# Patient Record
Sex: FEMALE | Race: WHITE | NOT HISPANIC OR LATINO | Employment: OTHER | URBAN - METROPOLITAN AREA
[De-identification: names, ages, dates, MRNs, and addresses within clinical notes are randomized per-mention and may not be internally consistent; named-entity substitution may affect disease eponyms.]

---

## 2019-03-19 ENCOUNTER — OUTSIDE FACILITY SERVICE (OUTPATIENT)
Dept: CARDIOLOGY | Facility: CLINIC | Age: 84
End: 2019-03-19

## 2019-03-19 PROCEDURE — 99223 1ST HOSP IP/OBS HIGH 75: CPT | Performed by: INTERNAL MEDICINE

## 2019-03-20 ENCOUNTER — HOSPITAL ENCOUNTER (INPATIENT)
Facility: HOSPITAL | Age: 84
LOS: 4 days | Discharge: HOME HEALTH | End: 2019-03-25
Attending: INTERNAL MEDICINE | Admitting: INTERNAL MEDICINE

## 2019-03-20 DIAGNOSIS — Z74.09 IMPAIRED FUNCTIONAL MOBILITY, BALANCE, GAIT, AND ENDURANCE: ICD-10-CM

## 2019-03-20 DIAGNOSIS — I48.21 PERMANENT ATRIAL FIBRILLATION (HCC): Primary | ICD-10-CM

## 2019-03-20 DIAGNOSIS — R00.1 BRADYCARDIA: ICD-10-CM

## 2019-03-20 DIAGNOSIS — Z86.73 HISTORY OF CVA IN ADULTHOOD: ICD-10-CM

## 2019-03-20 PROBLEM — Z78.9 POOR HISTORIAN: Status: ACTIVE | Noted: 2019-03-20

## 2019-03-20 PROBLEM — I10 ESSENTIAL HYPERTENSION: Status: ACTIVE | Noted: 2019-03-20

## 2019-03-20 PROBLEM — I42.9 CARDIOMYOPATHY (HCC): Status: ACTIVE | Noted: 2019-03-20

## 2019-03-20 PROBLEM — E78.5 HYPERLIPIDEMIA LDL GOAL <100: Status: ACTIVE | Noted: 2019-03-20

## 2019-03-20 PROBLEM — N17.9 AKI (ACUTE KIDNEY INJURY) (HCC): Status: ACTIVE | Noted: 2019-03-20

## 2019-03-20 PROBLEM — E11.9 DM (DIABETES MELLITUS), TYPE 2 (HCC): Status: ACTIVE | Noted: 2019-03-20

## 2019-03-20 PROBLEM — K21.9 GERD (GASTROESOPHAGEAL REFLUX DISEASE): Status: ACTIVE | Noted: 2019-03-20

## 2019-03-20 LAB
ALBUMIN SERPL-MCNC: 4.25 G/DL (ref 3.2–4.8)
ALBUMIN/GLOB SERPL: 1.7 G/DL (ref 1.5–2.5)
ALP SERPL-CCNC: 77 U/L (ref 25–100)
ALT SERPL W P-5'-P-CCNC: 428 U/L (ref 7–40)
ANION GAP SERPL CALCULATED.3IONS-SCNC: 8 MMOL/L (ref 3–11)
AST SERPL-CCNC: 336 U/L (ref 0–33)
BILIRUB SERPL-MCNC: 0.9 MG/DL (ref 0.3–1.2)
BUN BLD-MCNC: 20 MG/DL (ref 9–23)
BUN/CREAT SERPL: 25.3 (ref 7–25)
CALCIUM SPEC-SCNC: 9 MG/DL (ref 8.7–10.4)
CHLORIDE SERPL-SCNC: 105 MMOL/L (ref 99–109)
CO2 SERPL-SCNC: 22 MMOL/L (ref 20–31)
CREAT BLD-MCNC: 0.79 MG/DL (ref 0.6–1.3)
DEPRECATED RDW RBC AUTO: 56 FL (ref 37–54)
ERYTHROCYTE [DISTWIDTH] IN BLOOD BY AUTOMATED COUNT: 17 % (ref 11.3–14.5)
GFR SERPL CREATININE-BSD FRML MDRD: 69 ML/MIN/1.73
GLOBULIN UR ELPH-MCNC: 2.5 GM/DL
GLUCOSE BLD-MCNC: 101 MG/DL (ref 70–100)
GLUCOSE BLDC GLUCOMTR-MCNC: 114 MG/DL (ref 70–130)
GLUCOSE BLDC GLUCOMTR-MCNC: 191 MG/DL (ref 70–130)
HCT VFR BLD AUTO: 38 % (ref 34.5–44)
HGB BLD-MCNC: 11.8 G/DL (ref 11.5–15.5)
MAGNESIUM SERPL-MCNC: 1.5 MG/DL (ref 1.3–2.7)
MCH RBC QN AUTO: 29.1 PG (ref 27–31)
MCHC RBC AUTO-ENTMCNC: 31.1 G/DL (ref 32–36)
MCV RBC AUTO: 93.6 FL (ref 80–99)
PLATELET # BLD AUTO: 224 10*3/MM3 (ref 150–450)
PMV BLD AUTO: 11 FL (ref 6–12)
POTASSIUM BLD-SCNC: 4.3 MMOL/L (ref 3.5–5.5)
PROT SERPL-MCNC: 6.7 G/DL (ref 5.7–8.2)
RBC # BLD AUTO: 4.06 10*6/MM3 (ref 3.89–5.14)
SODIUM BLD-SCNC: 135 MMOL/L (ref 132–146)
WBC NRBC COR # BLD: 9.53 10*3/MM3 (ref 3.5–10.8)

## 2019-03-20 PROCEDURE — A9270 NON-COVERED ITEM OR SERVICE: HCPCS | Performed by: INTERNAL MEDICINE

## 2019-03-20 PROCEDURE — 02HK3JZ INSERTION OF PACEMAKER LEAD INTO RIGHT VENTRICLE, PERCUTANEOUS APPROACH: ICD-10-PCS | Performed by: INTERNAL MEDICINE

## 2019-03-20 PROCEDURE — 36415 COLL VENOUS BLD VENIPUNCTURE: CPT

## 2019-03-20 PROCEDURE — 63710000001 METOPROLOL SUCCINATE XL 25 MG TABLET SUSTAINED-RELEASE 24 HOUR: Performed by: INTERNAL MEDICINE

## 2019-03-20 PROCEDURE — 83735 ASSAY OF MAGNESIUM: CPT | Performed by: PHYSICIAN ASSISTANT

## 2019-03-20 PROCEDURE — 63710000001 METFORMIN 1000 MG TABLET: Performed by: INTERNAL MEDICINE

## 2019-03-20 PROCEDURE — 80053 COMPREHEN METABOLIC PANEL: CPT | Performed by: PHYSICIAN ASSISTANT

## 2019-03-20 PROCEDURE — 82962 GLUCOSE BLOOD TEST: CPT

## 2019-03-20 PROCEDURE — 0JH604Z INSERTION OF PACEMAKER, SINGLE CHAMBER INTO CHEST SUBCUTANEOUS TISSUE AND FASCIA, OPEN APPROACH: ICD-10-PCS | Performed by: INTERNAL MEDICINE

## 2019-03-20 PROCEDURE — 85027 COMPLETE CBC AUTOMATED: CPT | Performed by: PHYSICIAN ASSISTANT

## 2019-03-20 PROCEDURE — 63710000001 ACETAMINOPHEN 325 MG TABLET: Performed by: INTERNAL MEDICINE

## 2019-03-20 RX ORDER — LORATADINE 10 MG/1
10 TABLET ORAL DAILY
COMMUNITY

## 2019-03-20 RX ORDER — FUROSEMIDE 20 MG/1
10 TABLET ORAL DAILY
Status: DISCONTINUED | OUTPATIENT
Start: 2019-03-20 | End: 2019-03-22

## 2019-03-20 RX ORDER — DILTIAZEM HYDROCHLORIDE 360 MG/1
360 CAPSULE, EXTENDED RELEASE ORAL DAILY
COMMUNITY
End: 2019-03-25 | Stop reason: HOSPADM

## 2019-03-20 RX ORDER — ONDANSETRON 2 MG/ML
4 INJECTION INTRAMUSCULAR; INTRAVENOUS EVERY 6 HOURS PRN
Status: DISCONTINUED | OUTPATIENT
Start: 2019-03-20 | End: 2019-03-25 | Stop reason: HOSPADM

## 2019-03-20 RX ORDER — ACETAMINOPHEN 325 MG/1
650 TABLET ORAL EVERY 4 HOURS PRN
Status: DISCONTINUED | OUTPATIENT
Start: 2019-03-20 | End: 2019-03-20

## 2019-03-20 RX ORDER — SODIUM CHLORIDE 0.9 % (FLUSH) 0.9 %
3-10 SYRINGE (ML) INJECTION AS NEEDED
Status: DISCONTINUED | OUTPATIENT
Start: 2019-03-20 | End: 2019-03-25 | Stop reason: HOSPADM

## 2019-03-20 RX ORDER — DILTIAZEM HYDROCHLORIDE 180 MG/1
360 CAPSULE, COATED, EXTENDED RELEASE ORAL
Status: DISCONTINUED | OUTPATIENT
Start: 2019-03-21 | End: 2019-03-22

## 2019-03-20 RX ORDER — PANTOPRAZOLE SODIUM 40 MG/1
40 TABLET, DELAYED RELEASE ORAL EVERY MORNING
Status: DISCONTINUED | OUTPATIENT
Start: 2019-03-21 | End: 2019-03-25 | Stop reason: HOSPADM

## 2019-03-20 RX ORDER — PRAVASTATIN SODIUM 40 MG
40 TABLET ORAL DAILY
COMMUNITY

## 2019-03-20 RX ORDER — LISINOPRIL 5 MG/1
5 TABLET ORAL DAILY
COMMUNITY
End: 2019-03-25 | Stop reason: HOSPADM

## 2019-03-20 RX ORDER — LISINOPRIL 5 MG/1
5 TABLET ORAL DAILY
Status: DISCONTINUED | OUTPATIENT
Start: 2019-03-21 | End: 2019-03-23

## 2019-03-20 RX ORDER — CETIRIZINE HYDROCHLORIDE 10 MG/1
5 TABLET ORAL DAILY
Status: DISCONTINUED | OUTPATIENT
Start: 2019-03-21 | End: 2019-03-25 | Stop reason: HOSPADM

## 2019-03-20 RX ORDER — ATORVASTATIN CALCIUM 10 MG/1
10 TABLET, FILM COATED ORAL DAILY
Status: DISCONTINUED | OUTPATIENT
Start: 2019-03-20 | End: 2019-03-21

## 2019-03-20 RX ORDER — OMEPRAZOLE 20 MG/1
20 CAPSULE, DELAYED RELEASE ORAL 2 TIMES DAILY
COMMUNITY

## 2019-03-20 RX ORDER — METOPROLOL SUCCINATE 25 MG/1
25 TABLET, EXTENDED RELEASE ORAL 2 TIMES DAILY
COMMUNITY
End: 2019-03-25 | Stop reason: HOSPADM

## 2019-03-20 RX ORDER — SODIUM CHLORIDE 0.9 % (FLUSH) 0.9 %
3 SYRINGE (ML) INJECTION EVERY 12 HOURS SCHEDULED
Status: DISCONTINUED | OUTPATIENT
Start: 2019-03-20 | End: 2019-03-25 | Stop reason: HOSPADM

## 2019-03-20 RX ORDER — METOPROLOL SUCCINATE 25 MG/1
25 TABLET, EXTENDED RELEASE ORAL 2 TIMES DAILY
Status: DISCONTINUED | OUTPATIENT
Start: 2019-03-20 | End: 2019-03-24

## 2019-03-20 RX ORDER — PIOGLITAZONEHYDROCHLORIDE 30 MG/1
30 TABLET ORAL DAILY
COMMUNITY
End: 2019-06-10 | Stop reason: ALTCHOICE

## 2019-03-20 RX ORDER — NITROGLYCERIN 0.4 MG/1
0.4 TABLET SUBLINGUAL
Status: DISCONTINUED | OUTPATIENT
Start: 2019-03-20 | End: 2019-03-25 | Stop reason: HOSPADM

## 2019-03-20 RX ORDER — ACETAMINOPHEN 325 MG/1
325 TABLET ORAL EVERY 6 HOURS PRN
Status: DISCONTINUED | OUTPATIENT
Start: 2019-03-20 | End: 2019-03-25 | Stop reason: HOSPADM

## 2019-03-20 RX ORDER — PIOGLITAZONEHYDROCHLORIDE 15 MG/1
30 TABLET ORAL DAILY
Status: DISCONTINUED | OUTPATIENT
Start: 2019-03-21 | End: 2019-03-24

## 2019-03-20 RX ADMIN — METOPROLOL SUCCINATE 25 MG: 25 TABLET, EXTENDED RELEASE ORAL at 21:31

## 2019-03-20 RX ADMIN — METFORMIN HYDROCHLORIDE 1000 MG: 1000 TABLET, FILM COATED ORAL at 21:31

## 2019-03-20 RX ADMIN — SODIUM CHLORIDE, PRESERVATIVE FREE 3 ML: 5 INJECTION INTRAVENOUS at 21:32

## 2019-03-20 RX ADMIN — ACETAMINOPHEN 325 MG: 325 TABLET ORAL at 18:23

## 2019-03-21 LAB
GLUCOSE BLDC GLUCOMTR-MCNC: 77 MG/DL (ref 70–130)
GLUCOSE BLDC GLUCOMTR-MCNC: 77 MG/DL (ref 70–130)
GLUCOSE BLDC GLUCOMTR-MCNC: 80 MG/DL (ref 70–130)
GLUCOSE BLDC GLUCOMTR-MCNC: 82 MG/DL (ref 70–130)
GLUCOSE BLDC GLUCOMTR-MCNC: 86 MG/DL (ref 70–130)

## 2019-03-21 PROCEDURE — C1898 LEAD, PMKR, OTHER THAN TRANS: HCPCS | Performed by: INTERNAL MEDICINE

## 2019-03-21 PROCEDURE — C1892 INTRO/SHEATH,FIXED,PEEL-AWAY: HCPCS | Performed by: INTERNAL MEDICINE

## 2019-03-21 PROCEDURE — 02583ZZ DESTRUCTION OF CONDUCTION MECHANISM, PERCUTANEOUS APPROACH: ICD-10-PCS | Performed by: INTERNAL MEDICINE

## 2019-03-21 PROCEDURE — C1733 CATH, EP, OTHR THAN COOL-TIP: HCPCS | Performed by: INTERNAL MEDICINE

## 2019-03-21 PROCEDURE — A9270 NON-COVERED ITEM OR SERVICE: HCPCS | Performed by: INTERNAL MEDICINE

## 2019-03-21 PROCEDURE — 25010000002 FENTANYL CITRATE (PF) 100 MCG/2ML SOLUTION: Performed by: INTERNAL MEDICINE

## 2019-03-21 PROCEDURE — 33207 INSERT HEART PM VENTRICULAR: CPT | Performed by: INTERNAL MEDICINE

## 2019-03-21 PROCEDURE — 63710000001 SERTRALINE 50 MG TABLET: Performed by: INTERNAL MEDICINE

## 2019-03-21 PROCEDURE — 99024 POSTOP FOLLOW-UP VISIT: CPT | Performed by: INTERNAL MEDICINE

## 2019-03-21 PROCEDURE — 93650 ICAR CATH ABLTJ AV NODE FUNC: CPT | Performed by: INTERNAL MEDICINE

## 2019-03-21 PROCEDURE — 63710000001 CETIRIZINE 10 MG TABLET: Performed by: INTERNAL MEDICINE

## 2019-03-21 PROCEDURE — 63710000001 FUROSEMIDE 20 MG TABLET: Performed by: INTERNAL MEDICINE

## 2019-03-21 PROCEDURE — C1894 INTRO/SHEATH, NON-LASER: HCPCS | Performed by: INTERNAL MEDICINE

## 2019-03-21 PROCEDURE — 02K83ZZ MAP CONDUCTION MECHANISM, PERCUTANEOUS APPROACH: ICD-10-PCS | Performed by: INTERNAL MEDICINE

## 2019-03-21 PROCEDURE — 63710000001 LISINOPRIL 5 MG TABLET: Performed by: INTERNAL MEDICINE

## 2019-03-21 PROCEDURE — 63710000001 DILTIAZEM CD 180 MG CAPSULE SUSTAINED-RELEASE 24 HR: Performed by: INTERNAL MEDICINE

## 2019-03-21 PROCEDURE — 25010000003 CEFAZOLIN IN DEXTROSE 2-4 GM/100ML-% SOLUTION: Performed by: PHYSICIAN ASSISTANT

## 2019-03-21 PROCEDURE — 63710000001 METOPROLOL SUCCINATE XL 25 MG TABLET SUSTAINED-RELEASE 24 HOUR: Performed by: INTERNAL MEDICINE

## 2019-03-21 PROCEDURE — 25010000002 MIDAZOLAM PER 1 MG: Performed by: INTERNAL MEDICINE

## 2019-03-21 PROCEDURE — 82962 GLUCOSE BLOOD TEST: CPT

## 2019-03-21 PROCEDURE — C1786 PMKR, SINGLE, RATE-RESP: HCPCS | Performed by: INTERNAL MEDICINE

## 2019-03-21 PROCEDURE — 99152 MOD SED SAME PHYS/QHP 5/>YRS: CPT | Performed by: INTERNAL MEDICINE

## 2019-03-21 DEVICE — IMPLANTABLE DEVICE: Type: IMPLANTABLE DEVICE | Status: FUNCTIONAL

## 2019-03-21 DEVICE — PACEMAKER
Type: IMPLANTABLE DEVICE | Status: FUNCTIONAL
Brand: ESSENTIO™ SR

## 2019-03-21 RX ORDER — MIDAZOLAM HYDROCHLORIDE 1 MG/ML
INJECTION INTRAMUSCULAR; INTRAVENOUS AS NEEDED
Status: DISCONTINUED | OUTPATIENT
Start: 2019-03-21 | End: 2019-03-21 | Stop reason: HOSPADM

## 2019-03-21 RX ORDER — BUPIVACAINE HYDROCHLORIDE 5 MG/ML
INJECTION, SOLUTION PERINEURAL AS NEEDED
Status: DISCONTINUED | OUTPATIENT
Start: 2019-03-21 | End: 2019-03-21 | Stop reason: HOSPADM

## 2019-03-21 RX ORDER — CEFAZOLIN SODIUM 2 G/100ML
2 INJECTION, SOLUTION INTRAVENOUS EVERY 8 HOURS
Status: COMPLETED | OUTPATIENT
Start: 2019-03-22 | End: 2019-03-22

## 2019-03-21 RX ORDER — ACETAMINOPHEN 325 MG/1
650 TABLET ORAL EVERY 4 HOURS PRN
Status: DISCONTINUED | OUTPATIENT
Start: 2019-03-21 | End: 2019-03-25 | Stop reason: HOSPADM

## 2019-03-21 RX ORDER — TEMAZEPAM 15 MG/1
15 CAPSULE ORAL NIGHTLY PRN
Status: DISCONTINUED | OUTPATIENT
Start: 2019-03-21 | End: 2019-03-25 | Stop reason: HOSPADM

## 2019-03-21 RX ORDER — FENTANYL CITRATE 50 UG/ML
INJECTION, SOLUTION INTRAMUSCULAR; INTRAVENOUS AS NEEDED
Status: DISCONTINUED | OUTPATIENT
Start: 2019-03-21 | End: 2019-03-21 | Stop reason: HOSPADM

## 2019-03-21 RX ORDER — OXYCODONE HYDROCHLORIDE AND ACETAMINOPHEN 5; 325 MG/1; MG/1
1 TABLET ORAL EVERY 4 HOURS PRN
Status: DISCONTINUED | OUTPATIENT
Start: 2019-03-21 | End: 2019-03-25 | Stop reason: HOSPADM

## 2019-03-21 RX ORDER — SODIUM CHLORIDE 0.9 % (FLUSH) 0.9 %
3 SYRINGE (ML) INJECTION EVERY 12 HOURS SCHEDULED
Status: DISCONTINUED | OUTPATIENT
Start: 2019-03-21 | End: 2019-03-25 | Stop reason: HOSPADM

## 2019-03-21 RX ORDER — ACETAMINOPHEN 650 MG/1
650 SUPPOSITORY RECTAL EVERY 4 HOURS PRN
Status: DISCONTINUED | OUTPATIENT
Start: 2019-03-21 | End: 2019-03-25 | Stop reason: HOSPADM

## 2019-03-21 RX ORDER — SODIUM CHLORIDE 0.9 % (FLUSH) 0.9 %
1-10 SYRINGE (ML) INJECTION AS NEEDED
Status: DISCONTINUED | OUTPATIENT
Start: 2019-03-21 | End: 2019-03-25 | Stop reason: HOSPADM

## 2019-03-21 RX ORDER — ONDANSETRON 2 MG/ML
4 INJECTION INTRAMUSCULAR; INTRAVENOUS EVERY 6 HOURS PRN
Status: DISCONTINUED | OUTPATIENT
Start: 2019-03-21 | End: 2019-03-25 | Stop reason: SDUPTHER

## 2019-03-21 RX ORDER — CEFAZOLIN SODIUM 2 G/100ML
2 INJECTION, SOLUTION INTRAVENOUS ONCE
Status: COMPLETED | OUTPATIENT
Start: 2019-03-21 | End: 2019-03-21

## 2019-03-21 RX ORDER — LIDOCAINE HYDROCHLORIDE AND EPINEPHRINE 10; 10 MG/ML; UG/ML
INJECTION, SOLUTION INFILTRATION; PERINEURAL AS NEEDED
Status: DISCONTINUED | OUTPATIENT
Start: 2019-03-21 | End: 2019-03-21 | Stop reason: HOSPADM

## 2019-03-21 RX ORDER — ACETAMINOPHEN 160 MG/5ML
650 SOLUTION ORAL EVERY 4 HOURS PRN
Status: DISCONTINUED | OUTPATIENT
Start: 2019-03-21 | End: 2019-03-25 | Stop reason: HOSPADM

## 2019-03-21 RX ADMIN — CEFAZOLIN SODIUM 2 G: 2 INJECTION, SOLUTION INTRAVENOUS at 23:21

## 2019-03-21 RX ADMIN — DILTIAZEM HYDROCHLORIDE 360 MG: 180 CAPSULE, COATED, EXTENDED RELEASE ORAL at 08:25

## 2019-03-21 RX ADMIN — SODIUM CHLORIDE, PRESERVATIVE FREE 3 ML: 5 INJECTION INTRAVENOUS at 21:49

## 2019-03-21 RX ADMIN — METOPROLOL SUCCINATE 25 MG: 25 TABLET, EXTENDED RELEASE ORAL at 21:49

## 2019-03-21 RX ADMIN — LISINOPRIL 5 MG: 5 TABLET ORAL at 08:25

## 2019-03-21 RX ADMIN — SERTRALINE HYDROCHLORIDE 50 MG: 50 TABLET ORAL at 08:25

## 2019-03-21 RX ADMIN — CETIRIZINE HYDROCHLORIDE 5 MG: 10 TABLET, FILM COATED ORAL at 08:25

## 2019-03-21 RX ADMIN — METOPROLOL SUCCINATE 25 MG: 25 TABLET, EXTENDED RELEASE ORAL at 08:39

## 2019-03-21 RX ADMIN — CEFAZOLIN SODIUM 2 G: 2 INJECTION, SOLUTION INTRAVENOUS at 15:50

## 2019-03-21 RX ADMIN — OXYCODONE AND ACETAMINOPHEN 1 TABLET: 5; 325 TABLET ORAL at 23:17

## 2019-03-22 ENCOUNTER — APPOINTMENT (OUTPATIENT)
Dept: CT IMAGING | Facility: HOSPITAL | Age: 84
End: 2019-03-22

## 2019-03-22 ENCOUNTER — APPOINTMENT (OUTPATIENT)
Dept: GENERAL RADIOLOGY | Facility: HOSPITAL | Age: 84
End: 2019-03-22

## 2019-03-22 PROBLEM — J90 PLEURAL EFFUSION: Status: ACTIVE | Noted: 2019-03-22

## 2019-03-22 LAB
ALBUMIN SERPL-MCNC: 3.28 G/DL (ref 3.2–4.8)
ALBUMIN/GLOB SERPL: 1.7 G/DL (ref 1.5–2.5)
ALP SERPL-CCNC: 61 U/L (ref 25–100)
ALT SERPL W P-5'-P-CCNC: 128 U/L (ref 7–40)
ANION GAP SERPL CALCULATED.3IONS-SCNC: 8 MMOL/L (ref 3–11)
APPEARANCE FLD: CLEAR
APTT PPP: 30.1 SECONDS (ref 24–37)
AST SERPL-CCNC: 156 U/L (ref 0–33)
BILIRUB SERPL-MCNC: 0.4 MG/DL (ref 0.3–1.2)
BUN BLD-MCNC: 21 MG/DL (ref 9–23)
BUN/CREAT SERPL: 21.2 (ref 7–25)
CALCIUM SPEC-SCNC: 8.2 MG/DL (ref 8.7–10.4)
CHLORIDE SERPL-SCNC: 106 MMOL/L (ref 99–109)
CO2 SERPL-SCNC: 26 MMOL/L (ref 20–31)
COLOR FLD: YELLOW
CREAT BLD-MCNC: 0.99 MG/DL (ref 0.6–1.3)
GFR SERPL CREATININE-BSD FRML MDRD: 53 ML/MIN/1.73
GLOBULIN UR ELPH-MCNC: 1.9 GM/DL
GLUCOSE BLD-MCNC: 92 MG/DL (ref 70–100)
GLUCOSE FLD-MCNC: 167 MG/DL
INR PPP: 1.15 (ref 0.85–1.16)
LDH FLD-CCNC: 49 U/L
LYMPHOCYTES NFR FLD MANUAL: 40 %
MACROPHAGE FLUID: 44 %
MESOTHL CELL NFR FLD MANUAL: 3 %
MONOCYTES NFR FLD: 5 %
NEUTROPHILS NFR FLD MANUAL: 8 %
POTASSIUM BLD-SCNC: 4 MMOL/L (ref 3.5–5.5)
PROT FLD-MCNC: <2 G/DL
PROT SERPL-MCNC: 5.2 G/DL (ref 5.7–8.2)
PROTHROMBIN TIME: 14.2 SECONDS (ref 11.2–14.3)
RBC # FLD AUTO: <1000 /MM3
SODIUM BLD-SCNC: 140 MMOL/L (ref 132–146)
WBC # FLD AUTO: 151 /MM3

## 2019-03-22 PROCEDURE — 0W993ZZ DRAINAGE OF RIGHT PLEURAL CAVITY, PERCUTANEOUS APPROACH: ICD-10-PCS | Performed by: INTERNAL MEDICINE

## 2019-03-22 PROCEDURE — 82042 OTHER SOURCE ALBUMIN QUAN EA: CPT | Performed by: INTERNAL MEDICINE

## 2019-03-22 PROCEDURE — 75989 ABSCESS DRAINAGE UNDER X-RAY: CPT

## 2019-03-22 PROCEDURE — C1729 CATH, DRAINAGE: HCPCS

## 2019-03-22 PROCEDURE — 89051 BODY FLUID CELL COUNT: CPT | Performed by: INTERNAL MEDICINE

## 2019-03-22 PROCEDURE — 71046 X-RAY EXAM CHEST 2 VIEWS: CPT

## 2019-03-22 PROCEDURE — 99024 POSTOP FOLLOW-UP VISIT: CPT | Performed by: INTERNAL MEDICINE

## 2019-03-22 PROCEDURE — 85730 THROMBOPLASTIN TIME PARTIAL: CPT | Performed by: RADIOLOGY

## 2019-03-22 PROCEDURE — 83615 LACTATE (LD) (LDH) ENZYME: CPT | Performed by: INTERNAL MEDICINE

## 2019-03-22 PROCEDURE — 97161 PT EVAL LOW COMPLEX 20 MIN: CPT

## 2019-03-22 PROCEDURE — 85610 PROTHROMBIN TIME: CPT | Performed by: RADIOLOGY

## 2019-03-22 PROCEDURE — 25010000002 FUROSEMIDE PER 20 MG: Performed by: INTERNAL MEDICINE

## 2019-03-22 PROCEDURE — 80053 COMPREHEN METABOLIC PANEL: CPT | Performed by: PHYSICIAN ASSISTANT

## 2019-03-22 PROCEDURE — 82945 GLUCOSE OTHER FLUID: CPT | Performed by: INTERNAL MEDICINE

## 2019-03-22 PROCEDURE — 87206 SMEAR FLUORESCENT/ACID STAI: CPT | Performed by: INTERNAL MEDICINE

## 2019-03-22 PROCEDURE — 93005 ELECTROCARDIOGRAM TRACING: CPT | Performed by: INTERNAL MEDICINE

## 2019-03-22 PROCEDURE — 93010 ELECTROCARDIOGRAM REPORT: CPT | Performed by: INTERNAL MEDICINE

## 2019-03-22 PROCEDURE — 87116 MYCOBACTERIA CULTURE: CPT | Performed by: INTERNAL MEDICINE

## 2019-03-22 PROCEDURE — 25010000003 CEFAZOLIN IN DEXTROSE 2-4 GM/100ML-% SOLUTION: Performed by: INTERNAL MEDICINE

## 2019-03-22 PROCEDURE — 84157 ASSAY OF PROTEIN OTHER: CPT | Performed by: INTERNAL MEDICINE

## 2019-03-22 RX ORDER — FUROSEMIDE 20 MG/1
20 TABLET ORAL DAILY
Status: DISCONTINUED | OUTPATIENT
Start: 2019-03-22 | End: 2019-03-22

## 2019-03-22 RX ORDER — LIDOCAINE HYDROCHLORIDE 10 MG/ML
10 INJECTION, SOLUTION INFILTRATION; PERINEURAL ONCE
Status: COMPLETED | OUTPATIENT
Start: 2019-03-22 | End: 2019-03-22

## 2019-03-22 RX ORDER — FUROSEMIDE 20 MG/1
20 TABLET ORAL DAILY
Status: DISCONTINUED | OUTPATIENT
Start: 2019-03-23 | End: 2019-03-25 | Stop reason: HOSPADM

## 2019-03-22 RX ORDER — FUROSEMIDE 10 MG/ML
20 INJECTION INTRAMUSCULAR; INTRAVENOUS ONCE
Status: COMPLETED | OUTPATIENT
Start: 2019-03-22 | End: 2019-03-22

## 2019-03-22 RX ADMIN — METOPROLOL SUCCINATE 25 MG: 25 TABLET, EXTENDED RELEASE ORAL at 21:14

## 2019-03-22 RX ADMIN — CEFAZOLIN SODIUM 2 G: 2 INJECTION, SOLUTION INTRAVENOUS at 10:18

## 2019-03-22 RX ADMIN — LISINOPRIL 5 MG: 5 TABLET ORAL at 14:48

## 2019-03-22 RX ADMIN — LIDOCAINE HYDROCHLORIDE 10 ML: 10 INJECTION, SOLUTION INFILTRATION; PERINEURAL at 16:15

## 2019-03-22 RX ADMIN — PANTOPRAZOLE SODIUM 40 MG: 40 TABLET, DELAYED RELEASE ORAL at 06:49

## 2019-03-22 RX ADMIN — FUROSEMIDE 20 MG: 10 INJECTION, SOLUTION INTRAMUSCULAR; INTRAVENOUS at 09:17

## 2019-03-22 RX ADMIN — SODIUM CHLORIDE, PRESERVATIVE FREE 3 ML: 5 INJECTION INTRAVENOUS at 21:14

## 2019-03-22 RX ADMIN — METOPROLOL SUCCINATE 25 MG: 25 TABLET, EXTENDED RELEASE ORAL at 14:48

## 2019-03-22 RX ADMIN — Medication 1 EACH: at 09:00

## 2019-03-22 RX ADMIN — SODIUM CHLORIDE, PRESERVATIVE FREE 3 ML: 5 INJECTION INTRAVENOUS at 09:19

## 2019-03-22 RX ADMIN — PIOGLITAZONE 30 MG: 15 TABLET ORAL at 18:03

## 2019-03-22 RX ADMIN — OXYCODONE AND ACETAMINOPHEN 1 TABLET: 5; 325 TABLET ORAL at 14:55

## 2019-03-22 RX ADMIN — SERTRALINE HYDROCHLORIDE 50 MG: 50 TABLET ORAL at 18:03

## 2019-03-23 LAB
ALBUMIN FLD-MCNC: 0.7 G/DL
ALBUMIN SERPL-MCNC: 3.22 G/DL (ref 3.2–4.8)
ALBUMIN/GLOB SERPL: 1.8 G/DL (ref 1.5–2.5)
ALP SERPL-CCNC: 53 U/L (ref 25–100)
ALT SERPL W P-5'-P-CCNC: 31 U/L (ref 7–40)
ANION GAP SERPL CALCULATED.3IONS-SCNC: 8 MMOL/L (ref 3–11)
AST SERPL-CCNC: 102 U/L (ref 0–33)
BILIRUB SERPL-MCNC: 0.7 MG/DL (ref 0.3–1.2)
BNP SERPL-MCNC: 1269 PG/ML (ref 0–100)
BUN BLD-MCNC: 19 MG/DL (ref 9–23)
BUN/CREAT SERPL: 26 (ref 7–25)
CALCIUM SPEC-SCNC: 8.3 MG/DL (ref 8.7–10.4)
CHLORIDE SERPL-SCNC: 106 MMOL/L (ref 99–109)
CO2 SERPL-SCNC: 24 MMOL/L (ref 20–31)
CREAT BLD-MCNC: 0.73 MG/DL (ref 0.6–1.3)
GFR SERPL CREATININE-BSD FRML MDRD: 76 ML/MIN/1.73
GLOBULIN UR ELPH-MCNC: 1.8 GM/DL
GLUCOSE BLD-MCNC: 127 MG/DL (ref 70–100)
POTASSIUM BLD-SCNC: 4 MMOL/L (ref 3.5–5.5)
PROT SERPL-MCNC: 5 G/DL (ref 5.7–8.2)
SODIUM BLD-SCNC: 138 MMOL/L (ref 132–146)

## 2019-03-23 PROCEDURE — 80053 COMPREHEN METABOLIC PANEL: CPT | Performed by: INTERNAL MEDICINE

## 2019-03-23 PROCEDURE — 83880 ASSAY OF NATRIURETIC PEPTIDE: CPT | Performed by: INTERNAL MEDICINE

## 2019-03-23 PROCEDURE — 99024 POSTOP FOLLOW-UP VISIT: CPT | Performed by: INTERNAL MEDICINE

## 2019-03-23 RX ORDER — LISINOPRIL 10 MG/1
10 TABLET ORAL DAILY
Status: DISCONTINUED | OUTPATIENT
Start: 2019-03-24 | End: 2019-03-25

## 2019-03-23 RX ADMIN — OXYCODONE AND ACETAMINOPHEN 1 TABLET: 5; 325 TABLET ORAL at 13:22

## 2019-03-23 RX ADMIN — CETIRIZINE HYDROCHLORIDE 5 MG: 10 TABLET, FILM COATED ORAL at 08:07

## 2019-03-23 RX ADMIN — METOPROLOL SUCCINATE 25 MG: 25 TABLET, EXTENDED RELEASE ORAL at 08:07

## 2019-03-23 RX ADMIN — RIVAROXABAN 15 MG: 15 TABLET, FILM COATED ORAL at 17:41

## 2019-03-23 RX ADMIN — OXYCODONE AND ACETAMINOPHEN 1 TABLET: 5; 325 TABLET ORAL at 00:10

## 2019-03-23 RX ADMIN — LISINOPRIL 5 MG: 5 TABLET ORAL at 08:05

## 2019-03-23 RX ADMIN — METFORMIN HYDROCHLORIDE 1000 MG: 1000 TABLET, FILM COATED ORAL at 17:41

## 2019-03-23 RX ADMIN — PIOGLITAZONE 30 MG: 15 TABLET ORAL at 08:06

## 2019-03-23 RX ADMIN — PANTOPRAZOLE SODIUM 40 MG: 40 TABLET, DELAYED RELEASE ORAL at 06:25

## 2019-03-23 RX ADMIN — SODIUM CHLORIDE, PRESERVATIVE FREE 3 ML: 5 INJECTION INTRAVENOUS at 08:08

## 2019-03-23 RX ADMIN — SERTRALINE HYDROCHLORIDE 50 MG: 50 TABLET ORAL at 08:07

## 2019-03-23 RX ADMIN — SODIUM CHLORIDE, PRESERVATIVE FREE 3 ML: 5 INJECTION INTRAVENOUS at 21:14

## 2019-03-23 RX ADMIN — OXYCODONE AND ACETAMINOPHEN 1 TABLET: 5; 325 TABLET ORAL at 08:06

## 2019-03-23 RX ADMIN — FUROSEMIDE 20 MG: 20 TABLET ORAL at 08:07

## 2019-03-23 RX ADMIN — METFORMIN HYDROCHLORIDE 1000 MG: 1000 TABLET, FILM COATED ORAL at 08:06

## 2019-03-23 RX ADMIN — METOPROLOL SUCCINATE 25 MG: 25 TABLET, EXTENDED RELEASE ORAL at 21:13

## 2019-03-24 LAB
ANION GAP SERPL CALCULATED.3IONS-SCNC: 10 MMOL/L (ref 3–11)
BNP SERPL-MCNC: 623 PG/ML (ref 0–100)
BUN BLD-MCNC: 15 MG/DL (ref 9–23)
BUN/CREAT SERPL: 16.9 (ref 7–25)
CALCIUM SPEC-SCNC: 8.5 MG/DL (ref 8.7–10.4)
CHLORIDE SERPL-SCNC: 103 MMOL/L (ref 99–109)
CO2 SERPL-SCNC: 24 MMOL/L (ref 20–31)
CREAT BLD-MCNC: 0.89 MG/DL (ref 0.6–1.3)
GFR SERPL CREATININE-BSD FRML MDRD: 60 ML/MIN/1.73
GLUCOSE BLD-MCNC: 178 MG/DL (ref 70–100)
POTASSIUM BLD-SCNC: 3.5 MMOL/L (ref 3.5–5.5)
SODIUM BLD-SCNC: 137 MMOL/L (ref 132–146)
URATE SERPL-MCNC: 4.4 MG/DL (ref 3.1–7.8)

## 2019-03-24 PROCEDURE — 99024 POSTOP FOLLOW-UP VISIT: CPT | Performed by: INTERNAL MEDICINE

## 2019-03-24 PROCEDURE — 84550 ASSAY OF BLOOD/URIC ACID: CPT | Performed by: INTERNAL MEDICINE

## 2019-03-24 PROCEDURE — 80048 BASIC METABOLIC PNL TOTAL CA: CPT | Performed by: INTERNAL MEDICINE

## 2019-03-24 PROCEDURE — 83880 ASSAY OF NATRIURETIC PEPTIDE: CPT | Performed by: INTERNAL MEDICINE

## 2019-03-24 RX ORDER — METOPROLOL SUCCINATE 50 MG/1
50 TABLET, EXTENDED RELEASE ORAL
Status: DISCONTINUED | OUTPATIENT
Start: 2019-03-25 | End: 2019-03-25

## 2019-03-24 RX ORDER — PIOGLITAZONEHYDROCHLORIDE 15 MG/1
30 TABLET ORAL
Status: DISCONTINUED | OUTPATIENT
Start: 2019-03-24 | End: 2019-03-25 | Stop reason: HOSPADM

## 2019-03-24 RX ORDER — DOCUSATE SODIUM 100 MG/1
100 CAPSULE, LIQUID FILLED ORAL 2 TIMES DAILY
Status: DISCONTINUED | OUTPATIENT
Start: 2019-03-24 | End: 2019-03-25 | Stop reason: HOSPADM

## 2019-03-24 RX ADMIN — DOCUSATE SODIUM 100 MG: 100 CAPSULE, LIQUID FILLED ORAL at 12:11

## 2019-03-24 RX ADMIN — SERTRALINE HYDROCHLORIDE 50 MG: 50 TABLET ORAL at 09:05

## 2019-03-24 RX ADMIN — DOCUSATE SODIUM 100 MG: 100 CAPSULE, LIQUID FILLED ORAL at 20:22

## 2019-03-24 RX ADMIN — LISINOPRIL 10 MG: 10 TABLET ORAL at 09:05

## 2019-03-24 RX ADMIN — METFORMIN HYDROCHLORIDE 1000 MG: 1000 TABLET, FILM COATED ORAL at 17:06

## 2019-03-24 RX ADMIN — METOPROLOL SUCCINATE 25 MG: 25 TABLET, EXTENDED RELEASE ORAL at 09:05

## 2019-03-24 RX ADMIN — SODIUM CHLORIDE, PRESERVATIVE FREE 3 ML: 5 INJECTION INTRAVENOUS at 09:08

## 2019-03-24 RX ADMIN — SODIUM CHLORIDE, PRESERVATIVE FREE 3 ML: 5 INJECTION INTRAVENOUS at 20:22

## 2019-03-24 RX ADMIN — CETIRIZINE HYDROCHLORIDE 5 MG: 10 TABLET, FILM COATED ORAL at 09:06

## 2019-03-24 RX ADMIN — METFORMIN HYDROCHLORIDE 1000 MG: 1000 TABLET, FILM COATED ORAL at 09:05

## 2019-03-24 RX ADMIN — OXYCODONE AND ACETAMINOPHEN 1 TABLET: 5; 325 TABLET ORAL at 20:22

## 2019-03-24 RX ADMIN — FUROSEMIDE 20 MG: 20 TABLET ORAL at 09:05

## 2019-03-24 RX ADMIN — RIVAROXABAN 15 MG: 15 TABLET, FILM COATED ORAL at 17:06

## 2019-03-24 RX ADMIN — PANTOPRAZOLE SODIUM 40 MG: 40 TABLET, DELAYED RELEASE ORAL at 09:06

## 2019-03-24 RX ADMIN — PIOGLITAZONE 30 MG: 15 TABLET ORAL at 12:13

## 2019-03-24 RX ADMIN — OXYCODONE AND ACETAMINOPHEN 1 TABLET: 5; 325 TABLET ORAL at 09:12

## 2019-03-25 ENCOUNTER — APPOINTMENT (OUTPATIENT)
Dept: GENERAL RADIOLOGY | Facility: HOSPITAL | Age: 84
End: 2019-03-25

## 2019-03-25 VITALS
SYSTOLIC BLOOD PRESSURE: 149 MMHG | TEMPERATURE: 98.7 F | OXYGEN SATURATION: 97 % | HEIGHT: 60 IN | RESPIRATION RATE: 16 BRPM | HEART RATE: 75 BPM | BODY MASS INDEX: 26.32 KG/M2 | WEIGHT: 134.04 LBS | DIASTOLIC BLOOD PRESSURE: 85 MMHG

## 2019-03-25 PROCEDURE — 99221 1ST HOSP IP/OBS SF/LOW 40: CPT | Performed by: NURSE PRACTITIONER

## 2019-03-25 PROCEDURE — 73610 X-RAY EXAM OF ANKLE: CPT

## 2019-03-25 PROCEDURE — 73630 X-RAY EXAM OF FOOT: CPT

## 2019-03-25 PROCEDURE — 99024 POSTOP FOLLOW-UP VISIT: CPT | Performed by: INTERNAL MEDICINE

## 2019-03-25 RX ORDER — CARVEDILOL 12.5 MG/1
25 TABLET ORAL 2 TIMES DAILY WITH MEALS
Status: DISCONTINUED | OUTPATIENT
Start: 2019-03-25 | End: 2019-03-25 | Stop reason: HOSPADM

## 2019-03-25 RX ORDER — FUROSEMIDE 20 MG/1
20 TABLET ORAL DAILY
Qty: 30 TABLET | Refills: 5 | Status: SHIPPED | OUTPATIENT
Start: 2019-03-26 | End: 2019-03-26

## 2019-03-25 RX ORDER — NITROGLYCERIN 0.4 MG/1
0.4 TABLET SUBLINGUAL
Qty: 25 TABLET | Refills: 1 | Status: SHIPPED | OUTPATIENT
Start: 2019-03-25 | End: 2019-03-26

## 2019-03-25 RX ORDER — CARVEDILOL 25 MG/1
25 TABLET ORAL 2 TIMES DAILY WITH MEALS
Qty: 60 TABLET | Refills: 5 | Status: SHIPPED | OUTPATIENT
Start: 2019-03-25 | End: 2019-03-26

## 2019-03-25 RX ORDER — LISINOPRIL 20 MG/1
20 TABLET ORAL DAILY
Status: DISCONTINUED | OUTPATIENT
Start: 2019-03-25 | End: 2019-03-25 | Stop reason: HOSPADM

## 2019-03-25 RX ORDER — LISINOPRIL 20 MG/1
20 TABLET ORAL DAILY
Qty: 30 TABLET | Refills: 5 | Status: SHIPPED | OUTPATIENT
Start: 2019-03-26 | End: 2019-03-26

## 2019-03-25 RX ADMIN — PANTOPRAZOLE SODIUM 40 MG: 40 TABLET, DELAYED RELEASE ORAL at 04:20

## 2019-03-25 RX ADMIN — OXYCODONE AND ACETAMINOPHEN 1 TABLET: 5; 325 TABLET ORAL at 08:10

## 2019-03-25 RX ADMIN — SERTRALINE HYDROCHLORIDE 50 MG: 50 TABLET ORAL at 08:05

## 2019-03-25 RX ADMIN — CETIRIZINE HYDROCHLORIDE 5 MG: 10 TABLET, FILM COATED ORAL at 08:04

## 2019-03-25 RX ADMIN — METFORMIN HYDROCHLORIDE 1000 MG: 1000 TABLET, FILM COATED ORAL at 07:58

## 2019-03-25 RX ADMIN — LISINOPRIL 20 MG: 10 TABLET ORAL at 08:05

## 2019-03-25 RX ADMIN — SODIUM CHLORIDE, PRESERVATIVE FREE 3 ML: 5 INJECTION INTRAVENOUS at 08:11

## 2019-03-25 RX ADMIN — FUROSEMIDE 20 MG: 20 TABLET ORAL at 08:04

## 2019-03-25 RX ADMIN — CARVEDILOL 25 MG: 12.5 TABLET, FILM COATED ORAL at 08:04

## 2019-03-25 RX ADMIN — DOCUSATE SODIUM 100 MG: 100 CAPSULE, LIQUID FILLED ORAL at 08:05

## 2019-03-25 RX ADMIN — SODIUM CHLORIDE, PRESERVATIVE FREE 3 ML: 5 INJECTION INTRAVENOUS at 08:12

## 2019-03-26 ENCOUNTER — READMISSION MANAGEMENT (OUTPATIENT)
Dept: CALL CENTER | Facility: HOSPITAL | Age: 84
End: 2019-03-26

## 2019-03-26 RX ORDER — LISINOPRIL 20 MG/1
20 TABLET ORAL DAILY
Qty: 30 TABLET | Refills: 5 | Status: SHIPPED | OUTPATIENT
Start: 2019-03-26 | End: 2019-07-08 | Stop reason: SDUPTHER

## 2019-03-26 RX ORDER — CARVEDILOL 25 MG/1
25 TABLET ORAL 2 TIMES DAILY WITH MEALS
Qty: 60 TABLET | Refills: 5 | Status: SHIPPED | OUTPATIENT
Start: 2019-03-26 | End: 2019-10-17 | Stop reason: SDUPTHER

## 2019-03-26 RX ORDER — NITROGLYCERIN 0.4 MG/1
0.4 TABLET SUBLINGUAL
Qty: 25 TABLET | Refills: 1 | Status: SHIPPED | OUTPATIENT
Start: 2019-03-26

## 2019-03-26 RX ORDER — FUROSEMIDE 20 MG/1
20 TABLET ORAL DAILY
Qty: 30 TABLET | Refills: 5 | Status: SHIPPED | OUTPATIENT
Start: 2019-03-26 | End: 2019-06-10 | Stop reason: SDUPTHER

## 2019-03-26 NOTE — OUTREACH NOTE
Prep Survey      Responses   Facility patient discharged from?  [facility]   Is patient eligible?  Yes   Discharge diagnosis  Permanent atrial fibrillation,    Bradycardia,     Left pleural effusion,     Right foot pain   Does the patient have one of the following disease processes/diagnoses(primary or secondary)?  Other   Does the patient have Home health ordered?  Yes   What is the Home health agency?   [facility]   Is there a DME ordered?  No   Prep survey completed?  Yes          Haily Jacobs RN

## 2019-03-27 ENCOUNTER — READMISSION MANAGEMENT (OUTPATIENT)
Dept: CALL CENTER | Facility: HOSPITAL | Age: 84
End: 2019-03-27

## 2019-03-27 PROBLEM — I49.5 TACHY-BRADY SYNDROME (HCC): Status: ACTIVE | Noted: 2019-03-20

## 2019-03-27 PROBLEM — M79.605 PAIN IN BOTH LOWER EXTREMITIES: Status: ACTIVE | Noted: 2019-03-27

## 2019-03-27 PROBLEM — M79.604 PAIN IN BOTH LOWER EXTREMITIES: Status: ACTIVE | Noted: 2019-03-27

## 2019-03-27 NOTE — OUTREACH NOTE
Medical Week 1 Survey      Responses   Facility patient discharged from?  [facility]   Does the patient have one of the following disease processes/diagnoses(primary or secondary)?  Other   Is there a successful TCM telephone encounter documented?  No   Week 1 attempt successful?  Yes   Call start time  0843   Call end time  0850   Is patient permission given to speak with other caregiver?  Yes   List who call center can speak with  Rosemarie-daughter   Person spoke with today (if not patient) and relationship  Rosemarie-daughter   Meds reviewed with patient/caregiver?  Yes   Is the patient having any side effects they believe may be caused by any medication additions or changes?  No   Does the patient have all medications ordered at discharge?  Yes   Is the patient taking all medications as directed (includes completed medication regime)?  Yes   Does the patient have a primary care provider?   Yes   Does the patient have an appointment with their PCP within 7 days of discharge?  Greater than 7 days   What is preventing the patient from scheduling follow up appointments within 7 days of discharge?  Earlier appointment not available   Nursing Interventions  Verified appointment date/time/provider   Has the patient kept scheduled appointments due by today?  N/A   What is the Home health agency?   [facility]   Has home health visited the patient within 72 hours of discharge?  Yes   DME comments  Daughter states home health will be getting her a walker and wheelchair.   Psychosocial issues?  No   Psychosocial comments  Lives with daughter who also uses sitters. Daughter works.   Did the patient receive a copy of their discharge instructions?  Yes   Nursing interventions  Reviewed instructions with patient, Educated on MyChart   What is the patient's perception of their health status since discharge?  Improving   Is the patient/caregiver able to teach back signs and symptoms related to disease process for  when to call PCP?  Yes   Is the patient/caregiver able to teach back signs and symptoms related to disease process for when to call 911?  Yes   Is the patient/caregiver able to teach back the hierarchy of who to call/visit for symptoms/problems? PCP, Specialist, Home health nurse, Urgent Care, ED, 911  Yes   Week 1 call completed?  Yes   Wrap up additional comments  Daughter states seems to be feeling better-states slept well last night. States appetite slightly better. States home health will visit again today. Daughter works, but wants follow up calls.          Latosha Cui, RN

## 2019-03-28 LAB — REF LAB TEST METHOD: NORMAL

## 2019-03-29 ENCOUNTER — CLINICAL SUPPORT NO REQUIREMENTS (OUTPATIENT)
Dept: CARDIOLOGY | Facility: CLINIC | Age: 84
End: 2019-03-29

## 2019-03-29 DIAGNOSIS — I48.21 PERMANENT ATRIAL FIBRILLATION (HCC): Primary | ICD-10-CM

## 2019-04-04 ENCOUNTER — READMISSION MANAGEMENT (OUTPATIENT)
Dept: CALL CENTER | Facility: HOSPITAL | Age: 84
End: 2019-04-04

## 2019-04-04 ENCOUNTER — OFFICE VISIT (OUTPATIENT)
Dept: CARDIOLOGY | Facility: CLINIC | Age: 84
End: 2019-04-04

## 2019-04-04 DIAGNOSIS — I49.5 TACHY-BRADY SYNDROME (HCC): Primary | ICD-10-CM

## 2019-04-04 PROCEDURE — 99024 POSTOP FOLLOW-UP VISIT: CPT | Performed by: INTERNAL MEDICINE

## 2019-04-04 PROCEDURE — 93279 PRGRMG DEV EVAL PM/LDLS PM: CPT | Performed by: INTERNAL MEDICINE

## 2019-04-04 NOTE — OUTREACH NOTE
Medical Week 2 Survey      Responses   Facility patient discharged from?  [place]   Does the patient have one of the following disease processes/diagnoses(primary or secondary)?  Other   Week 2 attempt successful?  Yes   Call start time  1825   Discharge diagnosis  Permanent atrial fibrillation,    Bradycardia,     Left pleural effusion,     Right foot pain   Call end time  1829   Is patient permission given to speak with other caregiver?  Yes   List who call center can speak with  Rosemarie-daughter   Comments regarding PCP  Pt was seen by PCP today and daughter reports MD was pleased with progress.  She also had a pacemaker check.   Has the patient kept scheduled appointments due by today?  Yes   Comments  Wound clinic today-wound looks great has bandage on until saturday   What is the Home health agency?   [facility] will come again HH   Has all DME been delivered?  No   Psychosocial comments  Daughter cares for patient when she is not working.   Did the patient receive a copy of their discharge instructions?  Yes   Nursing interventions  Reviewed instructions with patient   What is the patient's perception of their health status since discharge?  Improving   Is the patient/caregiver able to teach back signs and symptoms related to disease process for when to call PCP?  Yes   Is the patient/caregiver able to teach back signs and symptoms related to disease process for when to call 911?  Yes   Is the patient/caregiver able to teach back the hierarchy of who to call/visit for symptoms/problems? PCP, Specialist, Home health nurse, Urgent Care, ED, 911  Yes   Week 2 Call Completed?  Yes   Wrap up additional comments  Daughter states patient is doing great.          Thea Hilario RN

## 2019-04-04 NOTE — PROGRESS NOTES
2019    Kandace Zhou, : 1933    WOUND CHECK      Patient has fever: [] YES   [x] NO     Temperature if indicated:       Wound Location:  Left shoulder      Dressing was:  Removed       Old Dressing Appearance:  Clean, dry        Wound Appearance:  Incision well-approximated with no signs or symptoms of infection        Gloves used, staples removed without diffuculty, wound cleansed with alcohol       Incision dresssed with triple antibiotic ointment, 4x4, and tegaderm with patient to remove in 3 days.  Verbal understanding from patient       Device was: Interrogated - Please see separate report        Plan:  Normal wound check      Appointment for follow-up scheduled for 3 months post procedure [x]    Future Appointments   Date Time Provider Department Center   2019 11:30 AM Connor Brand MD [facility] None           April TERESA Lan RN, 19

## 2019-04-11 ENCOUNTER — READMISSION MANAGEMENT (OUTPATIENT)
Dept: CALL CENTER | Facility: HOSPITAL | Age: 84
End: 2019-04-11

## 2019-04-22 ENCOUNTER — READMISSION MANAGEMENT (OUTPATIENT)
Dept: CALL CENTER | Facility: HOSPITAL | Age: 84
End: 2019-04-22

## 2019-04-22 NOTE — OUTREACH NOTE
Medical Week 4 Survey      Responses   Facility patient discharged from?  [place]   Does the patient have one of the following disease processes/diagnoses(primary or secondary)?  Other   Week 4 attempt successful?  Yes   Call start time  1850   Call end time  1854   Discharge diagnosis  Permanent atrial fibrillation,    Bradycardia,     Left pleural effusion,     Right foot pain   Is patient permission given to speak with other caregiver?  Yes   List who call center can speak with  Rosemarie-daughter   Person spoke with today (if not patient) and relationship  Rosemarie-daughter   Meds reviewed with patient/caregiver?  Yes   Is the patient having any side effects they believe may be caused by any medication additions or changes?  No   Is the patient taking all medications as directed (includes completed medication regime)?  Yes   Has the patient kept scheduled appointments due by today?  Yes   Comments  Wound is great, no scabs or anything.   Is the patient still receiving Home Health Services?  Yes   DME comments  DTR states no equipment received, will call local DME provider and verify what is covered.   Psychosocial issues?  No   What is the patient's perception of their health status since discharge?  Improving   Is the patient/caregiver able to teach back signs and symptoms related to disease process for when to call PCP?  Yes   Is the patient/caregiver able to teach back signs and symptoms related to disease process for when to call 911?  Yes   Is the patient/caregiver able to teach back the hierarchy of who to call/visit for symptoms/problems? PCP, Specialist, Home health nurse, Urgent Care, ED, 911  Yes   Additional teach back comments  DTR says she is doing very well.  She will investigate the needed items.   Week 4 Call Completed?  Yes   Would the patient like one additional call?  No   Graduated  Yes   Did the patient feel the follow up calls were helpful during their recovery period?  Yes   Was the number of  calls appropriate?  Yes          Tara Kimble RN

## 2019-05-03 LAB
MYCOBACTERIUM SPEC CULT: NORMAL
NIGHT BLUE STAIN TISS: NORMAL

## 2019-05-10 ENCOUNTER — TELEPHONE (OUTPATIENT)
Dept: CARDIOLOGY | Facility: CLINIC | Age: 84
End: 2019-05-10

## 2019-05-10 NOTE — TELEPHONE ENCOUNTER
Family states patient has had palpitations recently, and PCP increased lasix to 40mg daily due to edema/SOA.  They will recheck labs in one week.  Family wanted Dr. Brand to know.    Informed patient I would check with device clinic to check for any events (there were none).  They should let us know if patient continues to have problems or she doesn't respond to increase dose of lasix.    Family verbalized understanding.

## 2019-06-09 ENCOUNTER — HOSPITAL ENCOUNTER (EMERGENCY)
Facility: HOSPITAL | Age: 84
Discharge: HOME | End: 2019-06-09
Attending: EMERGENCY MEDICINE | Admitting: EMERGENCY MEDICINE

## 2019-06-09 ENCOUNTER — APPOINTMENT (OUTPATIENT)
Dept: GENERAL RADIOLOGY | Facility: HOSPITAL | Age: 84
End: 2019-06-09

## 2019-06-09 VITALS
HEIGHT: 60 IN | TEMPERATURE: 98.5 F | DIASTOLIC BLOOD PRESSURE: 85 MMHG | HEART RATE: 83 BPM | SYSTOLIC BLOOD PRESSURE: 149 MMHG | BODY MASS INDEX: 24.15 KG/M2 | WEIGHT: 123 LBS | RESPIRATION RATE: 22 BRPM | OXYGEN SATURATION: 93 %

## 2019-06-09 DIAGNOSIS — I50.33 ACUTE ON CHRONIC DIASTOLIC CHF (CONGESTIVE HEART FAILURE) (HCC): Primary | ICD-10-CM

## 2019-06-09 DIAGNOSIS — R53.83 FATIGUE, UNSPECIFIED TYPE: ICD-10-CM

## 2019-06-09 DIAGNOSIS — R06.01 ORTHOPNEA: ICD-10-CM

## 2019-06-09 DIAGNOSIS — J90 PLEURAL EFFUSION: ICD-10-CM

## 2019-06-09 LAB
ALBUMIN SERPL-MCNC: 3.9 G/DL (ref 3.5–5.2)
ALBUMIN/GLOB SERPL: 1.3 G/DL
ALP SERPL-CCNC: 52 U/L (ref 39–117)
ALT SERPL W P-5'-P-CCNC: 19 U/L (ref 1–33)
ANION GAP SERPL CALCULATED.3IONS-SCNC: 15 MMOL/L
AST SERPL-CCNC: 47 U/L (ref 1–32)
BASOPHILS # BLD AUTO: 0.04 10*3/MM3 (ref 0–0.2)
BASOPHILS NFR BLD AUTO: 0.6 % (ref 0–1.5)
BILIRUB SERPL-MCNC: 0.8 MG/DL (ref 0.2–1.2)
BILIRUB UR QL STRIP: NEGATIVE
BUN BLD-MCNC: 20 MG/DL (ref 8–23)
BUN/CREAT SERPL: 19.4 (ref 7–25)
CALCIUM SPEC-SCNC: 9.4 MG/DL (ref 8.6–10.5)
CHLORIDE SERPL-SCNC: 99 MMOL/L (ref 98–107)
CLARITY UR: CLEAR
CO2 SERPL-SCNC: 22 MMOL/L (ref 22–29)
COLOR UR: YELLOW
CREAT BLD-MCNC: 1.03 MG/DL (ref 0.57–1)
DEPRECATED RDW RBC AUTO: 62.2 FL (ref 37–54)
EOSINOPHIL # BLD AUTO: 0.01 10*3/MM3 (ref 0–0.4)
EOSINOPHIL NFR BLD AUTO: 0.1 % (ref 0.3–6.2)
ERYTHROCYTE [DISTWIDTH] IN BLOOD BY AUTOMATED COUNT: 18.1 % (ref 12.3–15.4)
GFR SERPL CREATININE-BSD FRML MDRD: 51 ML/MIN/1.73
GLOBULIN UR ELPH-MCNC: 3 GM/DL
GLUCOSE BLD-MCNC: 125 MG/DL (ref 65–99)
GLUCOSE UR STRIP-MCNC: NEGATIVE MG/DL
HCT VFR BLD AUTO: 39.3 % (ref 34–46.6)
HGB BLD-MCNC: 12 G/DL (ref 12–15.9)
HGB UR QL STRIP.AUTO: NEGATIVE
HOLD SPECIMEN: NORMAL
HOLD SPECIMEN: NORMAL
IMM GRANULOCYTES # BLD AUTO: 0.03 10*3/MM3 (ref 0–0.05)
IMM GRANULOCYTES NFR BLD AUTO: 0.4 % (ref 0–0.5)
KETONES UR QL STRIP: NEGATIVE
LEUKOCYTE ESTERASE UR QL STRIP.AUTO: NEGATIVE
LYMPHOCYTES # BLD AUTO: 1.29 10*3/MM3 (ref 0.7–3.1)
LYMPHOCYTES NFR BLD AUTO: 18.3 % (ref 19.6–45.3)
MCH RBC QN AUTO: 29.2 PG (ref 26.6–33)
MCHC RBC AUTO-ENTMCNC: 30.5 G/DL (ref 31.5–35.7)
MCV RBC AUTO: 95.6 FL (ref 79–97)
MONOCYTES # BLD AUTO: 0.54 10*3/MM3 (ref 0.1–0.9)
MONOCYTES NFR BLD AUTO: 7.6 % (ref 5–12)
NEUTROPHILS # BLD AUTO: 5.15 10*3/MM3 (ref 1.7–7)
NEUTROPHILS NFR BLD AUTO: 73 % (ref 42.7–76)
NITRITE UR QL STRIP: NEGATIVE
NRBC BLD AUTO-RTO: 0 /100 WBC (ref 0–0.2)
NT-PROBNP SERPL-MCNC: 6822 PG/ML (ref 5–1800)
PH UR STRIP.AUTO: 6.5 [PH] (ref 5–8)
PLATELET # BLD AUTO: 203 10*3/MM3 (ref 140–450)
PMV BLD AUTO: 10.4 FL (ref 6–12)
POTASSIUM BLD-SCNC: 4.1 MMOL/L (ref 3.5–5.2)
PROT SERPL-MCNC: 6.9 G/DL (ref 6–8.5)
PROT UR QL STRIP: NEGATIVE
RBC # BLD AUTO: 4.11 10*6/MM3 (ref 3.77–5.28)
SODIUM BLD-SCNC: 136 MMOL/L (ref 136–145)
SP GR UR STRIP: 1.01 (ref 1–1.03)
TROPONIN T SERPL-MCNC: <0.01 NG/ML (ref 0–0.03)
TROPONIN T SERPL-MCNC: <0.01 NG/ML (ref 0–0.03)
UROBILINOGEN UR QL STRIP: NORMAL
WBC NRBC COR # BLD: 7.06 10*3/MM3 (ref 3.4–10.8)
WHOLE BLOOD HOLD SPECIMEN: NORMAL
WHOLE BLOOD HOLD SPECIMEN: NORMAL

## 2019-06-09 PROCEDURE — 84484 ASSAY OF TROPONIN QUANT: CPT

## 2019-06-09 PROCEDURE — 99284 EMERGENCY DEPT VISIT MOD MDM: CPT

## 2019-06-09 PROCEDURE — 81003 URINALYSIS AUTO W/O SCOPE: CPT | Performed by: PHYSICIAN ASSISTANT

## 2019-06-09 PROCEDURE — 25010000002 FUROSEMIDE PER 20 MG: Performed by: PHYSICIAN ASSISTANT

## 2019-06-09 PROCEDURE — 93005 ELECTROCARDIOGRAM TRACING: CPT

## 2019-06-09 PROCEDURE — 85025 COMPLETE CBC W/AUTO DIFF WBC: CPT

## 2019-06-09 PROCEDURE — 80053 COMPREHEN METABOLIC PANEL: CPT

## 2019-06-09 PROCEDURE — 71045 X-RAY EXAM CHEST 1 VIEW: CPT

## 2019-06-09 PROCEDURE — 93005 ELECTROCARDIOGRAM TRACING: CPT | Performed by: EMERGENCY MEDICINE

## 2019-06-09 PROCEDURE — 84484 ASSAY OF TROPONIN QUANT: CPT | Performed by: EMERGENCY MEDICINE

## 2019-06-09 PROCEDURE — 96374 THER/PROPH/DIAG INJ IV PUSH: CPT

## 2019-06-09 PROCEDURE — 83880 ASSAY OF NATRIURETIC PEPTIDE: CPT

## 2019-06-09 RX ORDER — FUROSEMIDE 10 MG/ML
40 INJECTION INTRAMUSCULAR; INTRAVENOUS ONCE
Status: COMPLETED | OUTPATIENT
Start: 2019-06-09 | End: 2019-06-09

## 2019-06-09 RX ORDER — POTASSIUM CHLORIDE 20 MEQ/1
20 TABLET, EXTENDED RELEASE ORAL DAILY
COMMUNITY

## 2019-06-09 RX ORDER — SODIUM CHLORIDE 0.9 % (FLUSH) 0.9 %
10 SYRINGE (ML) INJECTION AS NEEDED
Status: DISCONTINUED | OUTPATIENT
Start: 2019-06-09 | End: 2019-06-09 | Stop reason: HOSPADM

## 2019-06-09 RX ADMIN — FUROSEMIDE 40 MG: 10 INJECTION, SOLUTION INTRAMUSCULAR; INTRAVENOUS at 14:23

## 2019-06-09 NOTE — ED PROVIDER NOTES
Subjective   Kandace Zhou is a 86 y.o.female who presents to the emergency department with complaints of shortness of breath. The patient's shortness of breath began yesterday night. She mentions that she felt fatigued last night and experienced intermittent headaches and SOB with exertion and lying flat. She has also had a decreased appetite and swelling in her legs that has worsened throughout the last several days. She denies dizziness, chest pain, cough, fever, chills, palpitations, vomiting, diarrhea, or urinary sx. She has not taken antibiotics recently. No reports of recent weight gain but haven't been able to monitor at home due to broken scale. At home she is able to move around on her own and does not require a walker or cane. She takes Lasix 20mg BID and has not recently increased the dose. There are no other acute complaints at this time. 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         History provided by:  Patient  Shortness of Breath   Severity:  Moderate  Onset quality:  Sudden  Duration:  1 day  Progression:  Worsening  Chronicity:  New  Ineffective treatments:  Diuretics  Associated symptoms: headaches    Associated symptoms: no abdominal pain, no chest pain, no cough, no ear pain, no fever, no rash, no sore throat and no vomiting        Review of Systems   Constitutional: Positive for fatigue. Negative for appetite change, chills and fever.   HENT: Negative for congestion, ear pain, sore throat and trouble swallowing.    Eyes: Negative for pain, redness and visual disturbance.   Respiratory: Positive for shortness of breath. Negative for cough and chest tightness.    Cardiovascular: Positive for leg swelling. Negative for chest pain and palpitations.   Gastrointestinal: Positive for nausea. Negative for abdominal pain, constipation, diarrhea  and vomiting.   Genitourinary: Negative for difficulty urinating, dysuria, flank pain, hematuria and vaginal bleeding.   Musculoskeletal: Negative for arthralgias, back pain, gait problem and joint swelling.   Skin: Negative for rash and wound.   Neurological: Positive for headaches. Negative for dizziness, syncope, speech difficulty, weakness and numbness.   Psychiatric/Behavioral: Negative for confusion.   All other systems reviewed and are negative.      Past Medical History:   Diagnosis Date   • Acid reflux    • Arrhythmia    • Atrial fibrillation (CMS/HCC)    • Diabetes mellitus (CMS/HCC)    • Hyperlipidemia    • Hypertension    • Incontinence    • Pleural effusion    • PONV (postoperative nausea and vomiting)    • SOB (shortness of breath)    • Stroke (CMS/HCC)     RIGHT SIDED WEAKNESS   • Weakness        Allergies   Allergen Reactions   • Sulfa Antibiotics Other (See Comments)     CANNOT REMEMBER   • Tetanus Toxoids Other (See Comments)     PT. CANNOT REMEMBER   • Tuberculin Tests Other (See Comments)     PT. CANNOT REMEMBER   • Lortab [Hydrocodone-Acetaminophen] Rash       Past Surgical History:   Procedure Laterality Date   • CARDIAC ELECTROPHYSIOLOGY PROCEDURE N/A 3/21/2019    Procedure: AV node ablation;  Surgeon: Stanley Minaya MD;  Location: [facility] EP INVASIVE LOCATION;  Service: Cardiovascular   • CARDIAC ELECTROPHYSIOLOGY PROCEDURE N/A 3/21/2019    Procedure: PACEMAKER IMPLANTATION-SC;  Surgeon: Stanley Minaya MD;  Location: [facility] EP INVASIVE LOCATION;  Service: Cardiovascular   • CARDIAC ELECTROPHYSIOLOGY PROCEDURE N/A 3/21/2019    Procedure: EP/Ablation AV Node;  Surgeon: Stanley Minaya MD;  Location: [facility] EP INVASIVE LOCATION;  Service: Cardiovascular   • HYSTERECTOMY     • HYSTERECTOMY     • OTHER SURGICAL HISTORY      LEFT ARM PLATE INSTALLED.       History reviewed. No pertinent family history.    Social History     Socioeconomic History   • Marital status:      Spouse  name: Not on file   • Number of children: Not on file   • Years of education: Not on file   • Highest education level: Not on file   Tobacco Use   • Smoking status: Former Smoker     Packs/day: 1.00     Years: 30.00     Pack years: 30.00   • Smokeless tobacco: Never Used   • Tobacco comment: QUIT 8 YEARS AGO   Substance and Sexual Activity   • Alcohol use: No     Frequency: Never   • Drug use: No   • Sexual activity: Defer         Objective   Physical Exam   Constitutional: She is oriented to person, place, and time. She appears well-developed and well-nourished. No distress.   HENT:   Head: Normocephalic and atraumatic.   Eyes: Conjunctivae are normal. No scleral icterus.   Neck: Normal range of motion. Neck supple.   Cardiovascular: Normal rate, regular rhythm and normal heart sounds.   Pulmonary/Chest: Effort normal and breath sounds normal. No respiratory distress. She has no decreased breath sounds. She has no wheezes. She has no rales.   Abdominal: Soft. There is no tenderness.   Musculoskeletal: Normal range of motion.        Right lower leg: She exhibits edema (mild, non pitting ).        Left lower leg: She exhibits edema (mild, non pitting).   Neurological: She is alert and oriented to person, place, and time.   Skin: Skin is warm and dry.   Psychiatric: She has a normal mood and affect. Her behavior is normal.   Nursing note and vitals reviewed.      Procedures         ED Course     Reviewed old records.     Discussed patient with Dr. Solis.     Discussed results and tx plan with patient and daughter. Will dc home patient after dose of Lasix in ED and will increase home Lasix regimen from 40 mg daily to 60 mg daily for the next 3 days and have her f/u with CHF clinic. She is already on K supplements. Went over concerning sx to return to ED.     Recent Results (from the past 24 hour(s))   Comprehensive Metabolic Panel    Collection Time: 06/09/19 11:19 AM   Result Value Ref Range    Glucose 125 (H) 65 - 99  mg/dL    BUN 20 8 - 23 mg/dL    Creatinine 1.03 (H) 0.57 - 1.00 mg/dL    Sodium 136 136 - 145 mmol/L    Potassium 4.1 3.5 - 5.2 mmol/L    Chloride 99 98 - 107 mmol/L    CO2 22.0 22.0 - 29.0 mmol/L    Calcium 9.4 8.6 - 10.5 mg/dL    Total Protein 6.9 6.0 - 8.5 g/dL    Albumin 3.90 3.50 - 5.20 g/dL    ALT (SGPT) 19 1 - 33 U/L    AST (SGOT) 47 (H) 1 - 32 U/L    Alkaline Phosphatase 52 39 - 117 U/L    Total Bilirubin 0.8 0.2 - 1.2 mg/dL    eGFR Non African Amer 51 (L) >60 mL/min/1.73    Globulin 3.0 gm/dL    A/G Ratio 1.3 g/dL    BUN/Creatinine Ratio 19.4 7.0 - 25.0    Anion Gap 15.0 mmol/L   BNP    Collection Time: 06/09/19 11:19 AM   Result Value Ref Range    proBNP 6,822.0 (H) 5.0-1,800.0 pg/mL   Troponin    Collection Time: 06/09/19 11:19 AM   Result Value Ref Range    Troponin T <0.010 0.000 - 0.030 ng/mL   Light Blue Top    Collection Time: 06/09/19 11:19 AM   Result Value Ref Range    Extra Tube hold for add-on    Green Top (Gel)    Collection Time: 06/09/19 11:19 AM   Result Value Ref Range    Extra Tube Hold for add-ons.    Lavender Top    Collection Time: 06/09/19 11:19 AM   Result Value Ref Range    Extra Tube hold for add-on    Gold Top - SST    Collection Time: 06/09/19 11:19 AM   Result Value Ref Range    Extra Tube Hold for add-ons.    CBC Auto Differential    Collection Time: 06/09/19 11:19 AM   Result Value Ref Range    WBC 7.06 3.40 - 10.80 10*3/mm3    RBC 4.11 3.77 - 5.28 10*6/mm3    Hemoglobin 12.0 12.0 - 15.9 g/dL    Hematocrit 39.3 34.0 - 46.6 %    MCV 95.6 79.0 - 97.0 fL    MCH 29.2 26.6 - 33.0 pg    MCHC 30.5 (L) 31.5 - 35.7 g/dL    RDW 18.1 (H) 12.3 - 15.4 %    RDW-SD 62.2 (H) 37.0 - 54.0 fl    MPV 10.4 6.0 - 12.0 fL    Platelets 203 140 - 450 10*3/mm3    Neutrophil % 73.0 42.7 - 76.0 %    Lymphocyte % 18.3 (L) 19.6 - 45.3 %    Monocyte % 7.6 5.0 - 12.0 %    Eosinophil % 0.1 (L) 0.3 - 6.2 %    Basophil % 0.6 0.0 - 1.5 %    Immature Grans % 0.4 0.0 - 0.5 %    Neutrophils, Absolute 5.15 1.70 -  7.00 10*3/mm3    Lymphocytes, Absolute 1.29 0.70 - 3.10 10*3/mm3    Monocytes, Absolute 0.54 0.10 - 0.90 10*3/mm3    Eosinophils, Absolute 0.01 0.00 - 0.40 10*3/mm3    Basophils, Absolute 0.04 0.00 - 0.20 10*3/mm3    Immature Grans, Absolute 0.03 0.00 - 0.05 10*3/mm3    nRBC 0.0 0.0 - 0.2 /100 WBC   Urinalysis With Culture If Indicated - Urine, Clean Catch    Collection Time: 06/09/19 12:16 PM   Result Value Ref Range    Color, UA Yellow Yellow, Straw    Appearance, UA Clear Clear    pH, UA 6.5 5.0 - 8.0    Specific Gravity, UA 1.012 1.001 - 1.030    Glucose, UA Negative Negative    Ketones, UA Negative Negative    Bilirubin, UA Negative Negative    Blood, UA Negative Negative    Protein, UA Negative Negative    Leuk Esterase, UA Negative Negative    Nitrite, UA Negative Negative    Urobilinogen, UA 0.2 E.U./dL 0.2 - 1.0 E.U./dL   Troponin    Collection Time: 06/09/19  1:17 PM   Result Value Ref Range    Troponin T <0.010 0.000 - 0.030 ng/mL     Note: In addition to lab results from this visit, the labs listed above may include labs taken at another facility or during a different encounter within the last 24 hours. Please correlate lab times with ED admission and discharge times for further clarification of the services performed during this visit.    XR Chest 1 View   Preliminary Result   Tiny bilateral pleural effusions with enlargement of the   heart and no acute parenchymal disease.       DICTATED:   06/09/2019   EDITED/ls :   06/09/2019 c                Vitals:    06/09/19 1329 06/09/19 1330 06/09/19 1359 06/09/19 1400   BP:  169/94  149/85   BP Location:       Patient Position:       Pulse:       Resp:       Temp:       TempSrc:       SpO2: 95% 93% 93%    Weight:       Height:         Medications   furosemide (LASIX) injection 40 mg (40 mg Intravenous Given 6/9/19 1423)     ECG/EMG Results (last 24 hours)     Procedure Component Value Units Date/Time    ECG 12 Lead [[id number]] Collected:  06/09/19 1116      Updated:  06/09/19 1117        ECG 12 Lead   Final Result   Test Reason : 2nd set   Blood Pressure : **/** mmHG   Vent. Rate : 075 BPM     Atrial Rate : 062 BPM      P-R Int : 000 ms          QRS Dur : 156 ms       QT Int : 460 ms       P-R-T Axes : 000 -72 098 degrees      QTc Int : 513 ms      Electronic ventricular pacemaker   When compared with ECG of 09-JUN-2019 11:16,   No significant change was found   Confirmed by DARYA TRIPATHI MD ([id number]) on 6/9/2019 1:56:15 PM      Referred By:  edmd           Confirmed By:DARYA TRIPATHI MD      ECG 12 Lead   Final Result   Test Reason : sob   Blood Pressure : **/** mmHG   Vent. Rate : 075 BPM     Atrial Rate : 075 BPM      P-R Int : 000 ms          QRS Dur : 162 ms       QT Int : 464 ms       P-R-T Axes : 000 -73 104 degrees      QTc Int : 518 ms      Electronic ventricular pacemaker   When compared with ECG of 22-MAR-2019 04:18,   No significant change was found   Confirmed by DARYA TRIPATHI MD ([id number]) on 6/9/2019 1:16:05 PM      Referred By:  edmd           Confirmed By:DARYA TRIPATHI MD                        [facility]    Final diagnoses:   Acute on chronic diastolic CHF (congestive heart failure) (CMS/[facility])   Fatigue, unspecified type   Orthopnea   Pleural effusion       Documentation assistance provided by alma Gregorio.  Information recorded by the scribe was done at my direction and has been verified and validated by me.     Romero Gregorio  06/09/19 1225       Clary Rosales PA  06/09/19 3085

## 2019-06-10 ENCOUNTER — OFFICE VISIT (OUTPATIENT)
Dept: CARDIOLOGY | Facility: HOSPITAL | Age: 84
End: 2019-06-10

## 2019-06-10 VITALS
HEART RATE: 75 BPM | DIASTOLIC BLOOD PRESSURE: 69 MMHG | RESPIRATION RATE: 18 BRPM | OXYGEN SATURATION: 96 % | TEMPERATURE: 97.2 F | HEIGHT: 60 IN | BODY MASS INDEX: 21.87 KG/M2 | SYSTOLIC BLOOD PRESSURE: 111 MMHG | WEIGHT: 111.38 LBS

## 2019-06-10 DIAGNOSIS — I48.21 PERMANENT ATRIAL FIBRILLATION (HCC): ICD-10-CM

## 2019-06-10 DIAGNOSIS — I50.23 ACUTE ON CHRONIC SYSTOLIC CONGESTIVE HEART FAILURE (HCC): Primary | ICD-10-CM

## 2019-06-10 DIAGNOSIS — I49.5 TACHY-BRADY SYNDROME (HCC): ICD-10-CM

## 2019-06-10 PROCEDURE — 99214 OFFICE O/P EST MOD 30 MIN: CPT | Performed by: NURSE PRACTITIONER

## 2019-06-10 RX ORDER — RIVAROXABAN 20 MG/1
20 TABLET, FILM COATED ORAL DAILY
Refills: 2 | COMMUNITY
Start: 2019-05-08 | End: 2020-01-01 | Stop reason: HOSPADM

## 2019-06-10 RX ORDER — FUROSEMIDE 20 MG/1
20 TABLET ORAL 2 TIMES DAILY
Qty: 90 TABLET | Refills: 0 | Status: ON HOLD | OUTPATIENT
Start: 2019-06-10 | End: 2020-01-01 | Stop reason: SDUPTHER

## 2019-06-10 RX ORDER — OXYCODONE HYDROCHLORIDE AND ACETAMINOPHEN 5; 325 MG/1; MG/1
1 TABLET ORAL 4 TIMES DAILY
Refills: 0 | COMMUNITY
Start: 2019-05-09

## 2019-06-10 RX ORDER — ALENDRONATE SODIUM 70 MG/1
70 TABLET ORAL WEEKLY
Refills: 4 | COMMUNITY
Start: 2019-06-05

## 2019-06-10 NOTE — PROGRESS NOTES
[facility]  [facility]      Encounter Date:06/10/2019     Kandace Zhou  [address] [place] [address]  [unfilled]    1/19/1933    Tejinder Acuna MD    Kandace Zhou is a 86 y.o. female.      Subjective:     Chief Complaint:   New patient- CHF    HPI   Patient is a 86-year-old female with past medical history significant for permanent A. fib s/p AVN ablation 3/2019, tachybrady syndrome, hypertension, hyperlipidemia, diabetes, stroke, cardiomyopathy (EF 40-45% in March at [facility]) who presents to the [facility] at the request of Clary DELEON/Dr. Solis.  Patient presented to the ED yesterday with complaints of shortness of breath.  Shortness of breath started 1 day prior to ED arrival with associated fatigue, headaches, ARIAS, orthopnea, decreased appetite and leg swelling for several days.  Chest x-ray showed tiny bilateral pleural effusions.  History of right pleural effusion status post thoracentesis back in March 2019.  Pro BNP was over 6000. She was given 40mg IV lasix. Symptoms have improved. She notes up until the other night she had been doing well on 40mg of lasix BID. She has lost 13lbs in a month due to lack of appetite. Denies sudden weight gain. Her daughter says she does like to eat salt    Patient Active Problem List   Diagnosis   • Permanent atrial fibrillation (CMS/HCC)   • Tachy-louann syndrome (CMS/HCC)   • Cardiomyopathy (CMS/HCC)   • Essential hypertension   • Hyperlipidemia LDL goal <100   • History of CVA in adulthood   • DM (diabetes mellitus), type 2 (CMS/HCC)   • GERD (gastroesophageal reflux disease)   • SOFI (acute kidney injury) (CMS/HCC)   • Poor historian   • Pleural effusion   • Pain in both lower extremities       Past Medical History:   Diagnosis Date   • Acid reflux    • Arrhythmia    • Atrial fibrillation (CMS/HCC)    • Diabetes mellitus (CMS/HCC)    • Hyperlipidemia    • Hypertension    •  Incontinence    • Pleural effusion    • PONV (postoperative nausea and vomiting)    • SOB (shortness of breath)    • Stroke (CMS/HCC)     RIGHT SIDED WEAKNESS   • Weakness        Past Surgical History:   Procedure Laterality Date   • CARDIAC ELECTROPHYSIOLOGY PROCEDURE N/A 3/21/2019    Procedure: AV node ablation;  Surgeon: Stanley Minaya MD;  Location:  [facility];  Service: Cardiovascular   • CARDIAC ELECTROPHYSIOLOGY PROCEDURE N/A 3/21/2019    Procedure: PACEMAKER IMPLANTATION-SC;  Surgeon: Stanley Minaya MD;  Location:  [facility];  Service: Cardiovascular   • CARDIAC ELECTROPHYSIOLOGY PROCEDURE N/A 3/21/2019    Procedure: EP/Ablation AV Node;  Surgeon: Stanley Minaya MD;  Location:  [facility];  Service: Cardiovascular   • HYSTERECTOMY     • HYSTERECTOMY     • OTHER SURGICAL HISTORY      LEFT ARM PLATE INSTALLED.       Family History   Problem Relation Age of Onset   • Cancer Mother    • Cancer Father    • Diabetes Maternal Grandmother    • Stroke Maternal Grandmother        Social History     Socioeconomic History   • Marital status:      Spouse name: Not on file   • Number of children: Not on file   • Years of education: Not on file   • Highest education level: Not on file   Tobacco Use   • Smoking status: Former Smoker     Packs/day: 1.00     Years: 30.00     Pack years: 30.00   • Smokeless tobacco: Never Used   • Tobacco comment: QUIT 8 YEARS AGO   Substance and Sexual Activity   • Alcohol use: No     Frequency: Never   • Drug use: No   • Sexual activity: Defer   Social History Narrative    Caffeine: 1 cup coffee daily, Mt Dew, chocolate        Allergies   Allergen Reactions   • Sulfa Antibiotics Other (See Comments)     CANNOT REMEMBER   • Tetanus Toxoids Other (See Comments)     PT. CANNOT REMEMBER   • Tuberculin Tests Other (See Comments)     PT. CANNOT REMEMBER   • Lortab [Hydrocodone-Acetaminophen] Rash         Current Outpatient  Medications:   •  alendronate (FOSAMAX) 70 MG tablet, Take 70 mg by mouth 1 (One) Time Per Week., Disp: , Rfl: 4  •  carvedilol (COREG) 25 MG tablet, Take 1 tablet by mouth 2 (Two) Times a Day With Meals., Disp: 60 tablet, Rfl: 5  •  Cholecalciferol (VITAMIN D3) 1000 units capsule, Take 1,000 Units by mouth Daily., Disp: , Rfl:   •  furosemide (LASIX) 20 MG tablet, Take 1 tablet by mouth 2 (Two) Times a Day. May take additional tablet as needed for increased shortness of breath, 3lb wt gain in 24 hours, Disp: 90 tablet, Rfl: 0  •  lisinopril (PRINIVIL,ZESTRIL) 20 MG tablet, Take 1 tablet by mouth Daily., Disp: 30 tablet, Rfl: 5  •  loratadine (CLARITIN) 10 MG tablet, Take 10 mg by mouth Daily., Disp: , Rfl:   •  metFORMIN (GLUCOPHAGE) 1000 MG tablet, Take 1,000 mg by mouth 2 (Two) Times a Day With Meals., Disp: , Rfl:   •  nitroglycerin (NITROSTAT) 0.4 MG SL tablet, Place 1 tablet under the tongue Every 5 (Five) Minutes As Needed for Chest Pain. Take no more than 3 doses in 15 minutes., Disp: 25 tablet, Rfl: 1  •  omeprazole (priLOSEC) 20 MG capsule, Take 20 mg by mouth Daily., Disp: , Rfl:   •  oxyCODONE-acetaminophen (PERCOCET) 5-325 MG per tablet, Take 1 tablet by mouth 3 (Three) Times a Day As Needed., Disp: , Rfl: 0  •  potassium chloride (K-DUR,KLOR-CON) 20 MEQ CR tablet, Take 20 mEq by mouth Daily., Disp: , Rfl:   •  pravastatin (PRAVACHOL) 40 MG tablet, Take 40 mg by mouth Daily., Disp: , Rfl:   •  sertraline (ZOLOFT) 50 MG tablet, Take 50 mg by mouth Daily., Disp: , Rfl:   •  XARELTO 20 MG tablet, Take 20 mg by mouth Daily., Disp: , Rfl: 2  No current facility-administered medications for this visit.     The following portions of the patient's history were reviewed today and updated as appropriate: allergies, current medications, past family history, past medical history, past social history, past surgical history and problem list     Review of Systems   Constitution: Positive for weakness, malaise/fatigue  "and weight loss. Negative for chills and fever.   HENT: Negative.    Eyes: Negative.    Cardiovascular: Positive for dyspnea on exertion, leg swelling and orthopnea. Negative for chest pain, claudication, cyanosis, irregular heartbeat, near-syncope, palpitations, paroxysmal nocturnal dyspnea and syncope.   Respiratory: Negative for cough, shortness of breath and snoring.    Endocrine: Negative.    Hematologic/Lymphatic: Does not bruise/bleed easily.   Skin: Negative for poor wound healing.   Musculoskeletal: Negative.    Gastrointestinal: Negative for abdominal pain, heartburn, hematemesis, melena, nausea and vomiting.   Genitourinary: Positive for nocturia. Negative for hematuria.   Neurological: Positive for excessive daytime sleepiness, light-headedness and loss of balance.   Psychiatric/Behavioral: Positive for altered mental status.   Allergic/Immunologic: Positive for environmental allergies.       Objective:     Vitals:    06/10/19 1316 06/10/19 1318   BP: 103/58 111/69   BP Location: Right arm Left arm   Patient Position: Sitting Sitting   Cuff Size: Adult Adult   Pulse: 75 75   Resp: 18    Temp: 97.2 °F (36.2 °C)    TempSrc: Temporal    SpO2: 97% 96%   Weight: 50.5 kg (111 lb 6 oz)    Height: 152.4 cm (60\")        Physical Exam   Constitutional: She is oriented to person, place, and time. She appears well-developed and well-nourished. No distress.   HENT:   Head: Normocephalic.   Eyes: Conjunctivae are normal. Pupils are equal, round, and reactive to light.   Neck: Neck supple. No JVD present. No thyromegaly present.   Cardiovascular: Normal rate, regular rhythm, normal heart sounds and intact distal pulses. Exam reveals no gallop and no friction rub.   No murmur heard.  Pulmonary/Chest: Effort normal and breath sounds normal. No respiratory distress. She has no wheezes. She has no rales. She exhibits no tenderness.   Abdominal: Soft. Bowel sounds are normal.   Musculoskeletal: Normal range of motion. She " exhibits no edema.   Neurological: She is alert and oriented to person, place, and time.   Skin: Skin is warm and dry.   Psychiatric: She has a normal mood and affect. Her behavior is normal. Thought content normal.   Vitals reviewed.      Lab and Diagnostic Review:  Results for orders placed or performed during the hospital encounter of 06/09/19   Comprehensive Metabolic Panel   Result Value Ref Range    Glucose 125 (H) 65 - 99 mg/dL    BUN 20 8 - 23 mg/dL    Creatinine 1.03 (H) 0.57 - 1.00 mg/dL    Sodium 136 136 - 145 mmol/L    Potassium 4.1 3.5 - 5.2 mmol/L    Chloride 99 98 - 107 mmol/L    CO2 22.0 22.0 - 29.0 mmol/L    Calcium 9.4 8.6 - 10.5 mg/dL    Total Protein 6.9 6.0 - 8.5 g/dL    Albumin 3.90 3.50 - 5.20 g/dL    ALT (SGPT) 19 1 - 33 U/L    AST (SGOT) 47 (H) 1 - 32 U/L    Alkaline Phosphatase 52 39 - 117 U/L    Total Bilirubin 0.8 0.2 - 1.2 mg/dL    eGFR Non African Amer 51 (L) >60 mL/min/1.73    Globulin 3.0 gm/dL    A/G Ratio 1.3 g/dL    BUN/Creatinine Ratio 19.4 7.0 - 25.0    Anion Gap 15.0 mmol/L   BNP   Result Value Ref Range    proBNP 6,822.0 (H) 5.0-1,800.0 pg/mL   Troponin   Result Value Ref Range    Troponin T <0.010 0.000 - 0.030 ng/mL   CBC Auto Differential   Result Value Ref Range    WBC 7.06 3.40 - 10.80 10*3/mm3    RBC 4.11 3.77 - 5.28 10*6/mm3    Hemoglobin 12.0 12.0 - 15.9 g/dL    Hematocrit 39.3 34.0 - 46.6 %    MCV 95.6 79.0 - 97.0 fL    MCH 29.2 26.6 - 33.0 pg    MCHC 30.5 (L) 31.5 - 35.7 g/dL    RDW 18.1 (H) 12.3 - 15.4 %    RDW-SD 62.2 (H) 37.0 - 54.0 fl    MPV 10.4 6.0 - 12.0 fL    Platelets 203 140 - 450 10*3/mm3    Neutrophil % 73.0 42.7 - 76.0 %    Lymphocyte % 18.3 (L) 19.6 - 45.3 %    Monocyte % 7.6 5.0 - 12.0 %    Eosinophil % 0.1 (L) 0.3 - 6.2 %    Basophil % 0.6 0.0 - 1.5 %    Immature Grans % 0.4 0.0 - 0.5 %    Neutrophils, Absolute 5.15 1.70 - 7.00 10*3/mm3    Lymphocytes, Absolute 1.29 0.70 - 3.10 10*3/mm3    Monocytes, Absolute 0.54 0.10 - 0.90 10*3/mm3    Eosinophils,  Absolute 0.01 0.00 - 0.40 10*3/mm3    Basophils, Absolute 0.04 0.00 - 0.20 10*3/mm3    Immature Grans, Absolute 0.03 0.00 - 0.05 10*3/mm3    nRBC 0.0 0.0 - 0.2 /100 WBC   Urinalysis With Culture If Indicated - Urine, Clean Catch   Result Value Ref Range    Color, UA Yellow Yellow, Straw    Appearance, UA Clear Clear    pH, UA 6.5 5.0 - 8.0    Specific Gravity, UA 1.012 1.001 - 1.030    Glucose, UA Negative Negative    Ketones, UA Negative Negative    Bilirubin, UA Negative Negative    Blood, UA Negative Negative    Protein, UA Negative Negative    Leuk Esterase, UA Negative Negative    Nitrite, UA Negative Negative    Urobilinogen, UA 0.2 E.U./dL 0.2 - 1.0 E.U./dL   Troponin   Result Value Ref Range    Troponin T <0.010 0.000 - 0.030 ng/mL     EKG: v-paced    Assessment and Plan:   1. Acute on chronic systolic congestive heart failure (CMS/HCC)  Symptoms improving  Continue lasix 60mg for next 5 days  Repeat labs in a week with PCP  Heart failure education provided today including signs and symptoms, causes of heart failure, medications, daily weights, low sodium diet (less than 2000 mg per day) and daily physical activity as tolerated. Reviewed HF Zones with patient and family.  - Basic Metabolic Panel; Future  - BNP; Future    2. Permanent atrial fibrillation (CMS/HCC)  S/p AVN ablation  Continue Xarelto    3. Tachy-louann syndrome (CMS/HCC)  S/p PPM    Keep fu with Dr. Brand  Due to traveling distance, patient prefers to fu PRN          It has been a pleasure to participate in the care of this patient.  Patient was instructed to call the [facility] Center with any questions, concerns, or worsening symptoms.    *Please note that portions of this note were completed with a voice recognition program. Efforts were made to edit the dictations, but occasionally words are mistranscribed.

## 2019-06-17 ENCOUNTER — TELEPHONE (OUTPATIENT)
Dept: CARDIOLOGY | Facility: HOSPITAL | Age: 84
End: 2019-06-17

## 2019-06-17 NOTE — TELEPHONE ENCOUNTER
Received labs from [facility] drawn 6/15/2019 showing a glucose of 114, BUN 29, creatinine 1.3, GFR 39, sodium 136, potassium 4.8, chloride 97, CO2 23.  Patient decreased Lasix to 40 mg on Friday (was on 60mg).  Continue with Lasix 40 mg daily.  Repeat labs with PCP in 3 to 4 weeks to make sure renal function stable.  Discussed this with patient's daughter over the phone.  Daughter verbalizes understanding and reports that patient's symptoms have significantly improved

## 2019-07-02 ENCOUNTER — OFFICE VISIT (OUTPATIENT)
Dept: CARDIOLOGY | Facility: CLINIC | Age: 84
End: 2019-07-02

## 2019-07-02 VITALS
HEART RATE: 80 BPM | WEIGHT: 111 LBS | HEIGHT: 60 IN | DIASTOLIC BLOOD PRESSURE: 58 MMHG | SYSTOLIC BLOOD PRESSURE: 116 MMHG | BODY MASS INDEX: 21.79 KG/M2 | OXYGEN SATURATION: 94 %

## 2019-07-02 DIAGNOSIS — E78.5 HYPERLIPIDEMIA LDL GOAL <100: ICD-10-CM

## 2019-07-02 DIAGNOSIS — I10 ESSENTIAL HYPERTENSION: ICD-10-CM

## 2019-07-02 DIAGNOSIS — I49.5 TACHY-BRADY SYNDROME (HCC): ICD-10-CM

## 2019-07-02 DIAGNOSIS — I48.21 PERMANENT ATRIAL FIBRILLATION (HCC): Primary | ICD-10-CM

## 2019-07-02 PROCEDURE — 93279 PRGRMG DEV EVAL PM/LDLS PM: CPT | Performed by: INTERNAL MEDICINE

## 2019-07-02 PROCEDURE — 99214 OFFICE O/P EST MOD 30 MIN: CPT | Performed by: INTERNAL MEDICINE

## 2019-07-02 NOTE — PROGRESS NOTES
Encounter Date:07/02/2019        Patient ID: Kandace Zhou is a 86 y.o. female.    PCP: Tejinder Acuna MD     Chief Complaint: Congestive Heart Failure      PROBLEM LIST:  1. Permanent atrial fibrillation:  a. CHADS-VASc = 5 (HTN, Age > 75, DM, Female), on Xarelto 20 mg.  2. Tachy-louann syndrome:  a. PPM implant, 03/21/2019: Successful implantation of a [employer] pacemaker.  b. AV node ablation, 03/21/2019.  3. Cardiomyopathy:  a. Echo, 03/05/2019: EF 40-45%.   4. Hypertension  5. Hyperlipidemia  6. DM2    History of Present Illness  Patient presents today for follow-up with a history of atrial fibrillation, cardiomyopathy, tachy-louann syndrome s/p PPM, and cardiac risk factors. Since last visit, she has been doing well overall from a cardiovascular standpoint. She does report some occasional shortness of breath. Denies chest pain, leg swelling, palpitations, and syncope. Remains busy and active with no significant cardiac limitations.    Allergies   Allergen Reactions   • Sulfa Antibiotics Other (See Comments)     CANNOT REMEMBER   • Tetanus Toxoids Other (See Comments)     PT. CANNOT REMEMBER   • Tuberculin Tests Other (See Comments)     PT. CANNOT REMEMBER   • Lortab [Hydrocodone-Acetaminophen] Rash         Current Outpatient Medications:   •  alendronate (FOSAMAX) 70 MG tablet, Take 70 mg by mouth 1 (One) Time Per Week., Disp: , Rfl: 4  •  carvedilol (COREG) 25 MG tablet, Take 1 tablet by mouth 2 (Two) Times a Day With Meals., Disp: 60 tablet, Rfl: 5  •  Cholecalciferol (VITAMIN D3) 1000 units capsule, Take 1,000 Units by mouth Daily., Disp: , Rfl:   •  furosemide (LASIX) 20 MG tablet, Take 1 tablet by mouth 2 (Two) Times a Day. May take additional tablet as needed for increased shortness of breath, 3lb wt gain in 24 hours, Disp: 90 tablet, Rfl: 0  •  lisinopril (PRINIVIL,ZESTRIL) 20 MG tablet, Take 1 tablet by mouth Daily., Disp: 30 tablet, Rfl: 5  •  loratadine (CLARITIN) 10 MG  tablet, Take 10 mg by mouth Daily., Disp: , Rfl:   •  metFORMIN (GLUCOPHAGE) 1000 MG tablet, Take 1,000 mg by mouth 2 (Two) Times a Day With Meals., Disp: , Rfl:   •  nitroglycerin (NITROSTAT) 0.4 MG SL tablet, Place 1 tablet under the tongue Every 5 (Five) Minutes As Needed for Chest Pain. Take no more than 3 doses in 15 minutes., Disp: 25 tablet, Rfl: 1  •  omeprazole (priLOSEC) 20 MG capsule, Take 20 mg by mouth Daily., Disp: , Rfl:   •  oxyCODONE-acetaminophen (PERCOCET) 5-325 MG per tablet, Take 1 tablet by mouth 3 (Three) Times a Day As Needed., Disp: , Rfl: 0  •  potassium chloride (K-DUR,KLOR-CON) 20 MEQ CR tablet, Take 20 mEq by mouth Daily., Disp: , Rfl:   •  pravastatin (PRAVACHOL) 40 MG tablet, Take 40 mg by mouth Daily., Disp: , Rfl:   •  sertraline (ZOLOFT) 50 MG tablet, Take 50 mg by mouth Daily., Disp: , Rfl:   •  XARELTO 20 MG tablet, Take 20 mg by mouth Daily., Disp: , Rfl: 2    The following portions of the patient's history were reviewed and updated as appropriate: allergies, current medications, past family history, past medical history, past social history, past surgical history and problem list.    ROS  Review of Systems   Constitution: Negative for chills, fatigue, fever, generalized weakness, weight gain and weight loss.   Cardiovascular: Negative for chest pain, claudication, dyspnea on exertion, leg swelling, orthopnea, palpitations, paroxysmal nocturnal dyspnea and syncope.   Respiratory: Negative for cough,and wheezing. Positive for shortness of breath.  HENT: Negative for ear pain, nosebleeds, and tinnitus.  Gastrointestinal: Negative for abdominal pain, constipation, diarrhea, nausea and vomiting.   Genitourinary: No urinary symptoms   Musculoskeletal: Negative for muscle cramps.  Neurological: Negative for dizziness, headaches, loss of balance, numbness, and symptoms of stroke.  Psychiatric: Normal mental status.     All other systems reviewed and are negative.    Objective:     BP  "116/58 (BP Location: Left arm, Patient Position: Sitting)   Pulse 80   Ht 152.4 cm (60\")   Wt 50.3 kg (111 lb)   SpO2 94%   BMI 21.68 kg/m²        Physical Exam  Constitutional: Patient appears well-developed and well-nourished.   HENT: HEENT exam unremarkable.   Neck: Neck supple. No JVD present. No carotid bruits.   Cardiovascular: Normal rate, regular rhythm and normal heart sounds. No murmur heard.   2+ symmetric pulses.   Pulmonary/Chest: Breath sounds normal. Does not exhibit tenderness.   Abdominal: Abdomen benign.   Musculoskeletal: Does not exhibit edema.   Neurological: Neurological exam unremarkable.   Vitals reviewed.    Lab Review:   Lab Results   Component Value Date    GLUCOSE 125 (H) 06/09/2019    BUN 20 06/09/2019    CREATININE 1.03 (H) 06/09/2019    EGFRIFNONA 51 (L) 06/09/2019    BCR 19.4 06/09/2019    K 4.1 06/09/2019    CO2 22.0 06/09/2019    CALCIUM 9.4 06/09/2019    ALBUMIN 3.90 06/09/2019    AST 47 (H) 06/09/2019    ALT 19 06/09/2019     Lab Results   Component Value Date    WBC 7.06 06/09/2019    HGB 12.0 06/09/2019    HCT 39.3 06/09/2019    MCV 95.6 06/09/2019     06/09/2019      Procedures     Manual device interrogation, 07/02/19: Normal, well-functioning [employer] pacemaker with 8 years of battery life remaining. Events: none since 04/04/2019.  Updates: Decreased RV output from 3.5V ti 2.5V to increase longevity and maintain safety margin.       Assessment:      Diagnosis Plan   1. Permanent atrial fibrillation (CMS/HCC)  Continue Xarelto 20 mg   2. Tachy-louann syndrome (CMS/HCC)  Normal device function, repeat interrogation in 6 months.   3. Essential hypertension  Well-controlled, continue current medications.   4. Hyperlipidemia LDL goal <100  Well-controlled, continue pravastatin 40 mg.     Plan:   Stable cardiac status.  Continue current medications.   FU in 6 MO with device check, sooner as needed.  Thank you for allowing us to participate in the care of your " patient.     Scribed for Connor Brand MD by Casi Cordoba. 7/2/2019  11:22 AM      I, Connor Brand MD, personally performed the services described in this documentation as scribed by the above named individual in my presence, and it is both accurate and complete.  7/2/2019  11:56 AM        Please note that portions of this note may have been completed with a voice recognition program. Efforts were made to edit the dictations, but occasionally words are mistranscribed.

## 2019-07-08 RX ORDER — LISINOPRIL 20 MG/1
20 TABLET ORAL DAILY
Qty: 90 TABLET | Refills: 1 | Status: SHIPPED | OUTPATIENT
Start: 2019-07-08

## 2019-08-07 ENCOUNTER — CLINICAL SUPPORT NO REQUIREMENTS (OUTPATIENT)
Dept: CARDIOLOGY | Facility: CLINIC | Age: 84
End: 2019-08-07

## 2019-08-07 DIAGNOSIS — I48.21 PERMANENT ATRIAL FIBRILLATION (HCC): ICD-10-CM

## 2019-08-07 DIAGNOSIS — Z98.890 S/P AV NODAL ABLATION: Primary | ICD-10-CM

## 2019-08-07 PROCEDURE — 93296 REM INTERROG EVL PM/IDS: CPT | Performed by: PHYSICIAN ASSISTANT

## 2019-08-07 PROCEDURE — 93294 REM INTERROG EVL PM/LDLS PM: CPT | Performed by: PHYSICIAN ASSISTANT

## 2019-09-06 RX ORDER — LISINOPRIL 20 MG/1
TABLET ORAL
Qty: 30 TABLET | Refills: 5 | Status: SHIPPED | OUTPATIENT
Start: 2019-09-06 | End: 2020-01-01 | Stop reason: SDUPTHER

## 2019-10-21 RX ORDER — CARVEDILOL 25 MG/1
25 TABLET ORAL 2 TIMES DAILY
Qty: 60 TABLET | Refills: 5 | Status: SHIPPED | OUTPATIENT
Start: 2019-10-21 | End: 2020-01-01 | Stop reason: SDUPTHER

## 2019-11-13 ENCOUNTER — CLINICAL SUPPORT NO REQUIREMENTS (OUTPATIENT)
Dept: CARDIOLOGY | Facility: CLINIC | Age: 84
End: 2019-11-13

## 2019-11-13 DIAGNOSIS — I48.21 PERMANENT ATRIAL FIBRILLATION (HCC): ICD-10-CM

## 2019-11-13 PROCEDURE — 93296 REM INTERROG EVL PM/IDS: CPT | Performed by: INTERNAL MEDICINE

## 2019-11-13 PROCEDURE — 93294 REM INTERROG EVL PM/LDLS PM: CPT | Performed by: INTERNAL MEDICINE

## 2020-01-01 ENCOUNTER — ANESTHESIA EVENT (OUTPATIENT)
Dept: TELEMETRY | Facility: HOSPITAL | Age: 85
End: 2020-01-01

## 2020-01-01 ENCOUNTER — ANESTHESIA (OUTPATIENT)
Dept: GASTROENTEROLOGY | Facility: HOSPITAL | Age: 85
End: 2020-01-01

## 2020-01-01 ENCOUNTER — OFFICE VISIT (OUTPATIENT)
Dept: CARDIOLOGY | Facility: CLINIC | Age: 85
End: 2020-01-01

## 2020-01-01 ENCOUNTER — TELEPHONE (OUTPATIENT)
Dept: CARDIOLOGY | Facility: CLINIC | Age: 85
End: 2020-01-01

## 2020-01-01 ENCOUNTER — ANESTHESIA EVENT (OUTPATIENT)
Dept: GASTROENTEROLOGY | Facility: HOSPITAL | Age: 85
End: 2020-01-01

## 2020-01-01 ENCOUNTER — APPOINTMENT (OUTPATIENT)
Dept: CARDIOLOGY | Facility: HOSPITAL | Age: 85
End: 2020-01-01

## 2020-01-01 ENCOUNTER — ANESTHESIA (OUTPATIENT)
Dept: TELEMETRY | Facility: HOSPITAL | Age: 85
End: 2020-01-01

## 2020-01-01 ENCOUNTER — APPOINTMENT (OUTPATIENT)
Dept: GENERAL RADIOLOGY | Facility: HOSPITAL | Age: 85
End: 2020-01-01

## 2020-01-01 ENCOUNTER — READMISSION MANAGEMENT (OUTPATIENT)
Dept: CALL CENTER | Facility: HOSPITAL | Age: 85
End: 2020-01-01

## 2020-01-01 ENCOUNTER — HOSPITAL ENCOUNTER (INPATIENT)
Facility: HOSPITAL | Age: 85
LOS: 4 days | Discharge: HOME | End: 2020-11-04
Attending: EMERGENCY MEDICINE | Admitting: INTERNAL MEDICINE

## 2020-01-01 VITALS
RESPIRATION RATE: 17 BRPM | BODY MASS INDEX: 18.12 KG/M2 | TEMPERATURE: 97.5 F | DIASTOLIC BLOOD PRESSURE: 63 MMHG | HEART RATE: 75 BPM | SYSTOLIC BLOOD PRESSURE: 92 MMHG | OXYGEN SATURATION: 99 % | WEIGHT: 92.3 LBS | HEIGHT: 60 IN

## 2020-01-01 VITALS
HEART RATE: 88 BPM | BODY MASS INDEX: 19.76 KG/M2 | HEIGHT: 59 IN | WEIGHT: 98 LBS | DIASTOLIC BLOOD PRESSURE: 54 MMHG | SYSTOLIC BLOOD PRESSURE: 94 MMHG

## 2020-01-01 VITALS
HEART RATE: 83 BPM | DIASTOLIC BLOOD PRESSURE: 70 MMHG | SYSTOLIC BLOOD PRESSURE: 128 MMHG | WEIGHT: 100 LBS | OXYGEN SATURATION: 98 % | HEIGHT: 59 IN | BODY MASS INDEX: 20.16 KG/M2

## 2020-01-01 DIAGNOSIS — I50.9 ACUTE ON CHRONIC CONGESTIVE HEART FAILURE, UNSPECIFIED HEART FAILURE TYPE (HCC): ICD-10-CM

## 2020-01-01 DIAGNOSIS — I10 ESSENTIAL HYPERTENSION: ICD-10-CM

## 2020-01-01 DIAGNOSIS — I48.21 PERMANENT ATRIAL FIBRILLATION (HCC): Primary | ICD-10-CM

## 2020-01-01 DIAGNOSIS — J96.11 CHRONIC RESPIRATORY FAILURE WITH HYPOXIA AND HYPERCAPNIA (HCC): ICD-10-CM

## 2020-01-01 DIAGNOSIS — Z86.39 HISTORY OF DIABETES MELLITUS: ICD-10-CM

## 2020-01-01 DIAGNOSIS — I42.0 DILATED CARDIOMYOPATHY (HCC): ICD-10-CM

## 2020-01-01 DIAGNOSIS — D50.0 CHRONIC BLOOD LOSS ANEMIA: ICD-10-CM

## 2020-01-01 DIAGNOSIS — I49.5 TACHY-BRADY SYNDROME (HCC): ICD-10-CM

## 2020-01-01 DIAGNOSIS — E78.5 HYPERLIPIDEMIA LDL GOAL <100: ICD-10-CM

## 2020-01-01 DIAGNOSIS — J96.12 CHRONIC RESPIRATORY FAILURE WITH HYPOXIA AND HYPERCAPNIA (HCC): ICD-10-CM

## 2020-01-01 DIAGNOSIS — K92.2 UPPER GI BLEED: Primary | ICD-10-CM

## 2020-01-01 DIAGNOSIS — Z79.01 ANTICOAGULATED: ICD-10-CM

## 2020-01-01 LAB
ABO GROUP BLD: NORMAL
ABO GROUP BLD: NORMAL
ALBUMIN SERPL-MCNC: 3.8 G/DL (ref 3.5–5.2)
ALBUMIN/GLOB SERPL: 1.7 G/DL
ALP SERPL-CCNC: 71 U/L (ref 39–117)
ALT SERPL W P-5'-P-CCNC: 71 U/L (ref 1–33)
ANION GAP SERPL CALCULATED.3IONS-SCNC: 10 MMOL/L (ref 5–15)
ANION GAP SERPL CALCULATED.3IONS-SCNC: 10 MMOL/L (ref 5–15)
ANION GAP SERPL CALCULATED.3IONS-SCNC: 11 MMOL/L (ref 5–15)
ANION GAP SERPL CALCULATED.3IONS-SCNC: 16 MMOL/L (ref 5–15)
ANION GAP SERPL CALCULATED.3IONS-SCNC: 9 MMOL/L (ref 5–15)
AST SERPL-CCNC: 82 U/L (ref 1–32)
BASOPHILS # BLD AUTO: 0 10*3/MM3 (ref 0–0.2)
BASOPHILS NFR BLD AUTO: 0 % (ref 0–1.5)
BH CV ECHO MEAS - AO ROOT AREA (BSA CORRECTED): 2
BH CV ECHO MEAS - AO ROOT AREA: 6.3 CM^2
BH CV ECHO MEAS - AO ROOT DIAM: 2.8 CM
BH CV ECHO MEAS - BSA(HAYCOCK): 1.4 M^2
BH CV ECHO MEAS - BSA: 1.4 M^2
BH CV ECHO MEAS - BZI_BMI: 20.9 KILOGRAMS/M^2
BH CV ECHO MEAS - BZI_METRIC_HEIGHT: 152.4 CM
BH CV ECHO MEAS - BZI_METRIC_WEIGHT: 48.5 KG
BH CV ECHO MEAS - EDV(CUBED): 54.8 ML
BH CV ECHO MEAS - EDV(MOD-SP2): 69 ML
BH CV ECHO MEAS - EDV(MOD-SP4): 85 ML
BH CV ECHO MEAS - EDV(TEICH): 61.9 ML
BH CV ECHO MEAS - EF(CUBED): 67.2 %
BH CV ECHO MEAS - EF(MOD-BP): 48 %
BH CV ECHO MEAS - EF(MOD-SP2): 43.5 %
BH CV ECHO MEAS - EF(MOD-SP4): 52.9 %
BH CV ECHO MEAS - EF(TEICH): 59.5 %
BH CV ECHO MEAS - ESV(CUBED): 18 ML
BH CV ECHO MEAS - ESV(MOD-SP2): 39 ML
BH CV ECHO MEAS - ESV(MOD-SP4): 40 ML
BH CV ECHO MEAS - ESV(TEICH): 25.1 ML
BH CV ECHO MEAS - FS: 31 %
BH CV ECHO MEAS - IVS/LVPW: 1
BH CV ECHO MEAS - IVSD: 0.99 CM
BH CV ECHO MEAS - LA DIMENSION: 3.7 CM
BH CV ECHO MEAS - LA/AO: 1.3
BH CV ECHO MEAS - LAD MAJOR: 6.5 CM
BH CV ECHO MEAS - LAT PEAK E' VEL: 12 CM/SEC
BH CV ECHO MEAS - LATERAL E/E' RATIO: 6.2
BH CV ECHO MEAS - LV DIASTOLIC VOL/BSA (35-75): 59.4 ML/M^2
BH CV ECHO MEAS - LV MASS(C)D: 113.1 GRAMS
BH CV ECHO MEAS - LV MASS(C)DI: 79 GRAMS/M^2
BH CV ECHO MEAS - LV MAX PG: 1.2 MMHG
BH CV ECHO MEAS - LV MEAN PG: 0.43 MMHG
BH CV ECHO MEAS - LV SYSTOLIC VOL/BSA (12-30): 28 ML/M^2
BH CV ECHO MEAS - LV V1 MAX: 55.3 CM/SEC
BH CV ECHO MEAS - LV V1 MEAN: 30 CM/SEC
BH CV ECHO MEAS - LV V1 VTI: 10 CM
BH CV ECHO MEAS - LVIDD: 3.8 CM
BH CV ECHO MEAS - LVIDS: 2.6 CM
BH CV ECHO MEAS - LVLD AP2: 6.4 CM
BH CV ECHO MEAS - LVLD AP4: 6.9 CM
BH CV ECHO MEAS - LVLS AP2: 6.1 CM
BH CV ECHO MEAS - LVLS AP4: 6.6 CM
BH CV ECHO MEAS - LVOT AREA (M): 2.5 CM^2
BH CV ECHO MEAS - LVOT AREA: 2.6 CM^2
BH CV ECHO MEAS - LVOT DIAM: 1.8 CM
BH CV ECHO MEAS - LVPWD: 0.96 CM
BH CV ECHO MEAS - MED PEAK E' VEL: 5.4 CM/SEC
BH CV ECHO MEAS - MEDIAL E/E' RATIO: 13.7
BH CV ECHO MEAS - MV DEC SLOPE: 404.8 CM/SEC^2
BH CV ECHO MEAS - MV DEC TIME: 0.1 SEC
BH CV ECHO MEAS - MV E MAX VEL: 75.5 CM/SEC
BH CV ECHO MEAS - MV P1/2T MAX VEL: 106.4 CM/SEC
BH CV ECHO MEAS - MV P1/2T: 77 MSEC
BH CV ECHO MEAS - MVA P1/2T LCG: 2.1 CM^2
BH CV ECHO MEAS - MVA(P1/2T): 2.9 CM^2
BH CV ECHO MEAS - PA ACC SLOPE: 717.7 CM/SEC^2
BH CV ECHO MEAS - PA ACC TIME: 0.09 SEC
BH CV ECHO MEAS - PA PR(ACCEL): 39.4 MMHG
BH CV ECHO MEAS - PI END-D VEL: 130.3 CM/SEC
BH CV ECHO MEAS - RAP SYSTOLE: 8 MMHG
BH CV ECHO MEAS - RVSP: 42 MMHG
BH CV ECHO MEAS - SI(CUBED): 25.7 ML/M^2
BH CV ECHO MEAS - SI(LVOT): 18.5 ML/M^2
BH CV ECHO MEAS - SI(MOD-SP2): 21 ML/M^2
BH CV ECHO MEAS - SI(MOD-SP4): 31.4 ML/M^2
BH CV ECHO MEAS - SI(TEICH): 25.7 ML/M^2
BH CV ECHO MEAS - SV(CUBED): 36.8 ML
BH CV ECHO MEAS - SV(LVOT): 26.5 ML
BH CV ECHO MEAS - SV(MOD-SP2): 30 ML
BH CV ECHO MEAS - SV(MOD-SP4): 45 ML
BH CV ECHO MEAS - SV(TEICH): 36.8 ML
BH CV ECHO MEAS - TAPSE (>1.6): 1.8 CM
BH CV ECHO MEAS - TR MAX PG: 34 MMHG
BH CV ECHO MEAS - TR MAX VEL: 290.6 CM/SEC
BH CV ECHO MEASUREMENTS AVERAGE E/E' RATIO: 8.68
BH CV VAS BP LEFT ARM: NORMAL MMHG
BH CV XLRA - TDI S': 11.8 CM/SEC
BILIRUB SERPL-MCNC: 0.8 MG/DL (ref 0–1.2)
BILIRUB UR QL STRIP: NEGATIVE
BLD GP AB SCN SERPL QL: NEGATIVE
BUN SERPL-MCNC: 13 MG/DL (ref 8–23)
BUN SERPL-MCNC: 22 MG/DL (ref 8–23)
BUN SERPL-MCNC: 31 MG/DL (ref 8–23)
BUN SERPL-MCNC: 41 MG/DL (ref 8–23)
BUN SERPL-MCNC: 42 MG/DL (ref 8–23)
BUN/CREAT SERPL: 16.7 (ref 7–25)
BUN/CREAT SERPL: 23.7 (ref 7–25)
BUN/CREAT SERPL: 30.1 (ref 7–25)
BUN/CREAT SERPL: 38.5 (ref 7–25)
BUN/CREAT SERPL: 39.4 (ref 7–25)
BURR CELLS BLD QL SMEAR: NORMAL
CALCIUM SPEC-SCNC: 8.4 MG/DL (ref 8.6–10.5)
CALCIUM SPEC-SCNC: 8.6 MG/DL (ref 8.6–10.5)
CALCIUM SPEC-SCNC: 8.7 MG/DL (ref 8.6–10.5)
CALCIUM SPEC-SCNC: 8.8 MG/DL (ref 8.6–10.5)
CALCIUM SPEC-SCNC: 9.2 MG/DL (ref 8.6–10.5)
CHLORIDE SERPL-SCNC: 100 MMOL/L (ref 98–107)
CHLORIDE SERPL-SCNC: 105 MMOL/L (ref 98–107)
CHLORIDE SERPL-SCNC: 96 MMOL/L (ref 98–107)
CHLORIDE SERPL-SCNC: 97 MMOL/L (ref 98–107)
CHLORIDE SERPL-SCNC: 99 MMOL/L (ref 98–107)
CLARITY UR: CLEAR
CO2 SERPL-SCNC: 22 MMOL/L (ref 22–29)
CO2 SERPL-SCNC: 25 MMOL/L (ref 22–29)
CO2 SERPL-SCNC: 26 MMOL/L (ref 22–29)
CO2 SERPL-SCNC: 31 MMOL/L (ref 22–29)
CO2 SERPL-SCNC: 32 MMOL/L (ref 22–29)
COLOR UR: YELLOW
CREAT SERPL-MCNC: 0.78 MG/DL (ref 0.57–1)
CREAT SERPL-MCNC: 0.93 MG/DL (ref 0.57–1)
CREAT SERPL-MCNC: 1.03 MG/DL (ref 0.57–1)
CREAT SERPL-MCNC: 1.04 MG/DL (ref 0.57–1)
CREAT SERPL-MCNC: 1.09 MG/DL (ref 0.57–1)
D-LACTATE SERPL-SCNC: 1.9 MMOL/L (ref 0.5–2)
DEPRECATED RDW RBC AUTO: 53.1 FL (ref 37–54)
DEPRECATED RDW RBC AUTO: 53.8 FL (ref 37–54)
DEPRECATED RDW RBC AUTO: 55.4 FL (ref 37–54)
DEPRECATED RDW RBC AUTO: 56.5 FL (ref 37–54)
DEPRECATED RDW RBC AUTO: 57.7 FL (ref 37–54)
DEVELOPER EXPIRATION DATE: ABNORMAL
DEVELOPER LOT NUMBER: 0
ELLIPTOCYTES BLD QL SMEAR: NORMAL
EOSINOPHIL # BLD AUTO: 0 10*3/MM3 (ref 0–0.4)
EOSINOPHIL NFR BLD AUTO: 0 % (ref 0.3–6.2)
ERYTHROCYTE [DISTWIDTH] IN BLOOD BY AUTOMATED COUNT: 19.8 % (ref 12.3–15.4)
ERYTHROCYTE [DISTWIDTH] IN BLOOD BY AUTOMATED COUNT: 20.2 % (ref 12.3–15.4)
ERYTHROCYTE [DISTWIDTH] IN BLOOD BY AUTOMATED COUNT: 20.8 % (ref 12.3–15.4)
ERYTHROCYTE [DISTWIDTH] IN BLOOD BY AUTOMATED COUNT: 21.4 % (ref 12.3–15.4)
ERYTHROCYTE [DISTWIDTH] IN BLOOD BY AUTOMATED COUNT: 22.5 % (ref 12.3–15.4)
EXPIRATION DATE: ABNORMAL
FECAL OCCULT BLOOD SCREEN, POC: POSITIVE
FERRITIN SERPL-MCNC: 73.55 NG/ML (ref 13–150)
FOLATE SERPL-MCNC: 10.2 NG/ML (ref 4.78–24.2)
GFR SERPL CREATININE-BSD FRML MDRD: 47 ML/MIN/1.73
GFR SERPL CREATININE-BSD FRML MDRD: 50 ML/MIN/1.73
GFR SERPL CREATININE-BSD FRML MDRD: 51 ML/MIN/1.73
GFR SERPL CREATININE-BSD FRML MDRD: 57 ML/MIN/1.73
GFR SERPL CREATININE-BSD FRML MDRD: 70 ML/MIN/1.73
GLOBULIN UR ELPH-MCNC: 2.3 GM/DL
GLUCOSE BLDC GLUCOMTR-MCNC: 100 MG/DL (ref 70–130)
GLUCOSE BLDC GLUCOMTR-MCNC: 105 MG/DL (ref 70–130)
GLUCOSE BLDC GLUCOMTR-MCNC: 108 MG/DL (ref 70–130)
GLUCOSE BLDC GLUCOMTR-MCNC: 110 MG/DL (ref 70–130)
GLUCOSE BLDC GLUCOMTR-MCNC: 122 MG/DL (ref 70–130)
GLUCOSE BLDC GLUCOMTR-MCNC: 127 MG/DL (ref 70–130)
GLUCOSE BLDC GLUCOMTR-MCNC: 132 MG/DL (ref 70–130)
GLUCOSE BLDC GLUCOMTR-MCNC: 140 MG/DL (ref 70–130)
GLUCOSE BLDC GLUCOMTR-MCNC: 141 MG/DL (ref 70–130)
GLUCOSE BLDC GLUCOMTR-MCNC: 145 MG/DL (ref 70–130)
GLUCOSE BLDC GLUCOMTR-MCNC: 150 MG/DL (ref 70–130)
GLUCOSE BLDC GLUCOMTR-MCNC: 151 MG/DL (ref 70–130)
GLUCOSE BLDC GLUCOMTR-MCNC: 154 MG/DL (ref 70–130)
GLUCOSE BLDC GLUCOMTR-MCNC: 157 MG/DL (ref 70–130)
GLUCOSE BLDC GLUCOMTR-MCNC: 160 MG/DL (ref 70–130)
GLUCOSE BLDC GLUCOMTR-MCNC: 194 MG/DL (ref 70–130)
GLUCOSE BLDC GLUCOMTR-MCNC: 203 MG/DL (ref 70–130)
GLUCOSE BLDC GLUCOMTR-MCNC: 229 MG/DL (ref 70–130)
GLUCOSE BLDC GLUCOMTR-MCNC: 255 MG/DL (ref 70–130)
GLUCOSE SERPL-MCNC: 106 MG/DL (ref 65–99)
GLUCOSE SERPL-MCNC: 137 MG/DL (ref 65–99)
GLUCOSE SERPL-MCNC: 137 MG/DL (ref 65–99)
GLUCOSE SERPL-MCNC: 143 MG/DL (ref 65–99)
GLUCOSE SERPL-MCNC: 193 MG/DL (ref 65–99)
GLUCOSE UR STRIP-MCNC: ABNORMAL MG/DL
HCT VFR BLD AUTO: 27.6 % (ref 34–46.6)
HCT VFR BLD AUTO: 27.6 % (ref 34–46.6)
HCT VFR BLD AUTO: 28.9 % (ref 34–46.6)
HCT VFR BLD AUTO: 29.3 % (ref 34–46.6)
HCT VFR BLD AUTO: 29.5 % (ref 34–46.6)
HCT VFR BLD AUTO: 29.7 % (ref 34–46.6)
HCT VFR BLD AUTO: 32.1 % (ref 34–46.6)
HCT VFR BLD AUTO: 35.2 % (ref 34–46.6)
HCT VFR BLD AUTO: 35.7 % (ref 34–46.6)
HCT VFR BLD AUTO: 37.7 % (ref 34–46.6)
HGB BLD-MCNC: 7.9 G/DL (ref 12–15.9)
HGB BLD-MCNC: 7.9 G/DL (ref 12–15.9)
HGB BLD-MCNC: 8.2 G/DL (ref 12–15.9)
HGB BLD-MCNC: 8.3 G/DL (ref 12–15.9)
HGB BLD-MCNC: 8.7 G/DL (ref 12–15.9)
HGB BLD-MCNC: 9.5 G/DL (ref 12–15.9)
HGB BLD-MCNC: 9.7 G/DL (ref 12–15.9)
HGB BLD-MCNC: 9.9 G/DL (ref 12–15.9)
HGB UR QL STRIP.AUTO: NEGATIVE
HOLD SPECIMEN: NORMAL
HOLD SPECIMEN: NORMAL
HYPOCHROMIA BLD QL: NORMAL
IMM GRANULOCYTES # BLD AUTO: 0.04 10*3/MM3 (ref 0–0.05)
IMM GRANULOCYTES NFR BLD AUTO: 0.4 % (ref 0–0.5)
IRON 24H UR-MRATE: 26 MCG/DL (ref 37–145)
IRON SATN MFR SERPL: 5 % (ref 20–50)
KETONES UR QL STRIP: NEGATIVE
LEFT ATRIUM VOLUME INDEX: 54.5 ML/M^2
LEFT ATRIUM VOLUME: 78 ML
LEUKOCYTE ESTERASE UR QL STRIP.AUTO: NEGATIVE
LYMPHOCYTES # BLD AUTO: 0.47 10*3/MM3 (ref 0.7–3.1)
LYMPHOCYTES NFR BLD AUTO: 4.3 % (ref 19.6–45.3)
Lab: ABNORMAL
MAGNESIUM SERPL-MCNC: 2.3 MG/DL (ref 1.6–2.4)
MAGNESIUM SERPL-MCNC: 2.4 MG/DL (ref 1.6–2.4)
MAXIMAL PREDICTED HEART RATE: 133 BPM
MCH RBC QN AUTO: 21.5 PG (ref 26.6–33)
MCH RBC QN AUTO: 21.5 PG (ref 26.6–33)
MCH RBC QN AUTO: 21.8 PG (ref 26.6–33)
MCH RBC QN AUTO: 21.9 PG (ref 26.6–33)
MCH RBC QN AUTO: 21.9 PG (ref 26.6–33)
MCHC RBC AUTO-ENTMCNC: 27.1 G/DL (ref 31.5–35.7)
MCHC RBC AUTO-ENTMCNC: 27.2 G/DL (ref 31.5–35.7)
MCHC RBC AUTO-ENTMCNC: 28.1 G/DL (ref 31.5–35.7)
MCHC RBC AUTO-ENTMCNC: 28.4 G/DL (ref 31.5–35.7)
MCHC RBC AUTO-ENTMCNC: 28.6 G/DL (ref 31.5–35.7)
MCV RBC AUTO: 75.7 FL (ref 79–97)
MCV RBC AUTO: 76.7 FL (ref 79–97)
MCV RBC AUTO: 77.5 FL (ref 79–97)
MCV RBC AUTO: 79.5 FL (ref 79–97)
MCV RBC AUTO: 80.6 FL (ref 79–97)
MONOCYTES # BLD AUTO: 0.27 10*3/MM3 (ref 0.1–0.9)
MONOCYTES NFR BLD AUTO: 2.4 % (ref 5–12)
NEGATIVE CONTROL: NEGATIVE
NEUTROPHILS NFR BLD AUTO: 10.26 10*3/MM3 (ref 1.7–7)
NEUTROPHILS NFR BLD AUTO: 92.9 % (ref 42.7–76)
NITRITE UR QL STRIP: NEGATIVE
NRBC BLD AUTO-RTO: 3.4 /100 WBC (ref 0–0.2)
NT-PROBNP SERPL-MCNC: 5538 PG/ML (ref 0–1800)
OVALOCYTES BLD QL SMEAR: NORMAL
PH UR STRIP.AUTO: 6.5 [PH] (ref 5–8)
PLAT MORPH BLD: NORMAL
PLATELET # BLD AUTO: 139 10*3/MM3 (ref 140–450)
PLATELET # BLD AUTO: 202 10*3/MM3 (ref 140–450)
PLATELET # BLD AUTO: 230 10*3/MM3 (ref 140–450)
PLATELET # BLD AUTO: 242 10*3/MM3 (ref 140–450)
PLATELET # BLD AUTO: 292 10*3/MM3 (ref 140–450)
PMV BLD AUTO: 10.6 FL (ref 6–12)
PMV BLD AUTO: 11 FL (ref 6–12)
PMV BLD AUTO: 11.1 FL (ref 6–12)
PMV BLD AUTO: 11.3 FL (ref 6–12)
PMV BLD AUTO: 11.8 FL (ref 6–12)
POSITIVE CONTROL: POSITIVE
POTASSIUM SERPL-SCNC: 3.4 MMOL/L (ref 3.5–5.2)
POTASSIUM SERPL-SCNC: 4.1 MMOL/L (ref 3.5–5.2)
POTASSIUM SERPL-SCNC: 4.1 MMOL/L (ref 3.5–5.2)
POTASSIUM SERPL-SCNC: 4.2 MMOL/L (ref 3.5–5.2)
POTASSIUM SERPL-SCNC: 5.1 MMOL/L (ref 3.5–5.2)
PROT SERPL-MCNC: 6.1 G/DL (ref 6–8.5)
PROT UR QL STRIP: NEGATIVE
QT INTERVAL: 452 MS
QTC INTERVAL: 515 MS
RBC # BLD AUTO: 3.6 10*6/MM3 (ref 3.77–5.28)
RBC # BLD AUTO: 3.82 10*6/MM3 (ref 3.77–5.28)
RBC # BLD AUTO: 4.04 10*6/MM3 (ref 3.77–5.28)
RBC # BLD AUTO: 4.43 10*6/MM3 (ref 3.77–5.28)
RBC # BLD AUTO: 4.54 10*6/MM3 (ref 3.77–5.28)
RH BLD: POSITIVE
RH BLD: POSITIVE
SARS-COV-2 RDRP RESP QL NAA+PROBE: NOT DETECTED
SODIUM SERPL-SCNC: 132 MMOL/L (ref 136–145)
SODIUM SERPL-SCNC: 137 MMOL/L (ref 136–145)
SODIUM SERPL-SCNC: 138 MMOL/L (ref 136–145)
SODIUM SERPL-SCNC: 140 MMOL/L (ref 136–145)
SODIUM SERPL-SCNC: 142 MMOL/L (ref 136–145)
SP GR UR STRIP: 1.01 (ref 1–1.03)
STRESS TARGET HR: 113 BPM
T&S EXPIRATION DATE: NORMAL
TARGETS BLD QL SMEAR: NORMAL
TIBC SERPL-MCNC: 477 MCG/DL (ref 298–536)
TRANSFERRIN SERPL-MCNC: 320 MG/DL (ref 200–360)
TROPONIN T SERPL-MCNC: <0.01 NG/ML (ref 0–0.03)
TSH SERPL DL<=0.05 MIU/L-ACNC: 0.3 UIU/ML (ref 0.27–4.2)
UROBILINOGEN UR QL STRIP: ABNORMAL
VIT B12 BLD-MCNC: 871 PG/ML (ref 211–946)
WBC # BLD AUTO: 11.04 10*3/MM3 (ref 3.4–10.8)
WBC # BLD AUTO: 12.9 10*3/MM3 (ref 3.4–10.8)
WBC # BLD AUTO: 13.48 10*3/MM3 (ref 3.4–10.8)
WBC # BLD AUTO: 8.91 10*3/MM3 (ref 3.4–10.8)
WBC # BLD AUTO: 9.78 10*3/MM3 (ref 3.4–10.8)
WBC MORPH BLD: NORMAL
WHOLE BLOOD HOLD SPECIMEN: NORMAL
WHOLE BLOOD HOLD SPECIMEN: NORMAL

## 2020-01-01 PROCEDURE — 93288 INTERROG EVL PM/LDLS PM IP: CPT | Performed by: INTERNAL MEDICINE

## 2020-01-01 PROCEDURE — 99214 OFFICE O/P EST MOD 30 MIN: CPT | Performed by: INTERNAL MEDICINE

## 2020-01-01 PROCEDURE — 85014 HEMATOCRIT: CPT | Performed by: INTERNAL MEDICINE

## 2020-01-01 PROCEDURE — 85014 HEMATOCRIT: CPT | Performed by: PHYSICIAN ASSISTANT

## 2020-01-01 PROCEDURE — 99284 EMERGENCY DEPT VISIT MOD MDM: CPT

## 2020-01-01 PROCEDURE — 80048 BASIC METABOLIC PNL TOTAL CA: CPT | Performed by: PHYSICIAN ASSISTANT

## 2020-01-01 PROCEDURE — 83605 ASSAY OF LACTIC ACID: CPT

## 2020-01-01 PROCEDURE — 85027 COMPLETE CBC AUTOMATED: CPT | Performed by: PHYSICIAN ASSISTANT

## 2020-01-01 PROCEDURE — 87635 SARS-COV-2 COVID-19 AMP PRB: CPT | Performed by: INTERNAL MEDICINE

## 2020-01-01 PROCEDURE — 94799 UNLISTED PULMONARY SVC/PX: CPT

## 2020-01-01 PROCEDURE — 99219 PR INITIAL OBSERVATION CARE/DAY 50 MINUTES: CPT | Performed by: NURSE PRACTITIONER

## 2020-01-01 PROCEDURE — 80053 COMPREHEN METABOLIC PANEL: CPT

## 2020-01-01 PROCEDURE — 80048 BASIC METABOLIC PNL TOTAL CA: CPT | Performed by: INTERNAL MEDICINE

## 2020-01-01 PROCEDURE — 86850 RBC ANTIBODY SCREEN: CPT | Performed by: INTERNAL MEDICINE

## 2020-01-01 PROCEDURE — 97166 OT EVAL MOD COMPLEX 45 MIN: CPT

## 2020-01-01 PROCEDURE — 0DJD8ZZ INSPECTION OF LOWER INTESTINAL TRACT, VIA NATURAL OR ARTIFICIAL OPENING ENDOSCOPIC: ICD-10-PCS | Performed by: INTERNAL MEDICINE

## 2020-01-01 PROCEDURE — 86901 BLOOD TYPING SEROLOGIC RH(D): CPT

## 2020-01-01 PROCEDURE — 94640 AIRWAY INHALATION TREATMENT: CPT

## 2020-01-01 PROCEDURE — 85018 HEMOGLOBIN: CPT | Performed by: PHYSICIAN ASSISTANT

## 2020-01-01 PROCEDURE — 85025 COMPLETE CBC W/AUTO DIFF WBC: CPT

## 2020-01-01 PROCEDURE — 82962 GLUCOSE BLOOD TEST: CPT

## 2020-01-01 PROCEDURE — 82728 ASSAY OF FERRITIN: CPT | Performed by: PHYSICIAN ASSISTANT

## 2020-01-01 PROCEDURE — 93306 TTE W/DOPPLER COMPLETE: CPT

## 2020-01-01 PROCEDURE — 84466 ASSAY OF TRANSFERRIN: CPT | Performed by: PHYSICIAN ASSISTANT

## 2020-01-01 PROCEDURE — 82746 ASSAY OF FOLIC ACID SERUM: CPT | Performed by: PHYSICIAN ASSISTANT

## 2020-01-01 PROCEDURE — 71045 X-RAY EXAM CHEST 1 VIEW: CPT

## 2020-01-01 PROCEDURE — 86901 BLOOD TYPING SEROLOGIC RH(D): CPT | Performed by: INTERNAL MEDICINE

## 2020-01-01 PROCEDURE — 99239 HOSP IP/OBS DSCHRG MGMT >30: CPT | Performed by: INTERNAL MEDICINE

## 2020-01-01 PROCEDURE — 93005 ELECTROCARDIOGRAM TRACING: CPT | Performed by: EMERGENCY MEDICINE

## 2020-01-01 PROCEDURE — 83735 ASSAY OF MAGNESIUM: CPT

## 2020-01-01 PROCEDURE — 82270 OCCULT BLOOD FECES: CPT | Performed by: EMERGENCY MEDICINE

## 2020-01-01 PROCEDURE — 99232 SBSQ HOSP IP/OBS MODERATE 35: CPT | Performed by: INTERNAL MEDICINE

## 2020-01-01 PROCEDURE — 83735 ASSAY OF MAGNESIUM: CPT | Performed by: INTERNAL MEDICINE

## 2020-01-01 PROCEDURE — 85018 HEMOGLOBIN: CPT | Performed by: INTERNAL MEDICINE

## 2020-01-01 PROCEDURE — 85027 COMPLETE CBC AUTOMATED: CPT | Performed by: INTERNAL MEDICINE

## 2020-01-01 PROCEDURE — 81003 URINALYSIS AUTO W/O SCOPE: CPT | Performed by: EMERGENCY MEDICINE

## 2020-01-01 PROCEDURE — 84484 ASSAY OF TROPONIN QUANT: CPT

## 2020-01-01 PROCEDURE — 45378 DIAGNOSTIC COLONOSCOPY: CPT | Performed by: INTERNAL MEDICINE

## 2020-01-01 PROCEDURE — 93306 TTE W/DOPPLER COMPLETE: CPT | Performed by: INTERNAL MEDICINE

## 2020-01-01 PROCEDURE — 85007 BL SMEAR W/DIFF WBC COUNT: CPT

## 2020-01-01 PROCEDURE — 99223 1ST HOSP IP/OBS HIGH 75: CPT | Performed by: INTERNAL MEDICINE

## 2020-01-01 PROCEDURE — G0378 HOSPITAL OBSERVATION PER HR: HCPCS

## 2020-01-01 PROCEDURE — 84443 ASSAY THYROID STIM HORMONE: CPT | Performed by: PHYSICIAN ASSISTANT

## 2020-01-01 PROCEDURE — 25010000002 PHENYLEPHRINE PER 1 ML: Performed by: NURSE ANESTHETIST, CERTIFIED REGISTERED

## 2020-01-01 PROCEDURE — 99233 SBSQ HOSP IP/OBS HIGH 50: CPT | Performed by: INTERNAL MEDICINE

## 2020-01-01 PROCEDURE — 43235 EGD DIAGNOSTIC BRUSH WASH: CPT | Performed by: INTERNAL MEDICINE

## 2020-01-01 PROCEDURE — 0DJ08ZZ INSPECTION OF UPPER INTESTINAL TRACT, VIA NATURAL OR ARTIFICIAL OPENING ENDOSCOPIC: ICD-10-PCS | Performed by: INTERNAL MEDICINE

## 2020-01-01 PROCEDURE — 82607 VITAMIN B-12: CPT | Performed by: PHYSICIAN ASSISTANT

## 2020-01-01 PROCEDURE — 86900 BLOOD TYPING SEROLOGIC ABO: CPT

## 2020-01-01 PROCEDURE — 93005 ELECTROCARDIOGRAM TRACING: CPT

## 2020-01-01 PROCEDURE — 83880 ASSAY OF NATRIURETIC PEPTIDE: CPT | Performed by: EMERGENCY MEDICINE

## 2020-01-01 PROCEDURE — 86900 BLOOD TYPING SEROLOGIC ABO: CPT | Performed by: INTERNAL MEDICINE

## 2020-01-01 PROCEDURE — 25010000002 PROPOFOL 10 MG/ML EMULSION: Performed by: NURSE ANESTHETIST, CERTIFIED REGISTERED

## 2020-01-01 PROCEDURE — 97162 PT EVAL MOD COMPLEX 30 MIN: CPT | Performed by: PHYSICAL THERAPIST

## 2020-01-01 PROCEDURE — 83540 ASSAY OF IRON: CPT | Performed by: PHYSICIAN ASSISTANT

## 2020-01-01 PROCEDURE — 63710000001 INSULIN LISPRO (HUMAN) PER 5 UNITS: Performed by: PHYSICIAN ASSISTANT

## 2020-01-01 PROCEDURE — 25010000002 FUROSEMIDE PER 20 MG: Performed by: EMERGENCY MEDICINE

## 2020-01-01 RX ORDER — MIRTAZAPINE 15 MG/1
15 TABLET, FILM COATED ORAL NIGHTLY
Status: DISCONTINUED | OUTPATIENT
Start: 2020-01-01 | End: 2020-01-01 | Stop reason: HOSPADM

## 2020-01-01 RX ORDER — LIDOCAINE HYDROCHLORIDE 10 MG/ML
0.5 INJECTION, SOLUTION EPIDURAL; INFILTRATION; INTRACAUDAL; PERINEURAL ONCE AS NEEDED
Status: DISCONTINUED | OUTPATIENT
Start: 2020-01-01 | End: 2020-01-01 | Stop reason: HOSPADM

## 2020-01-01 RX ORDER — MIRTAZAPINE 15 MG/1
15 TABLET, FILM COATED ORAL NIGHTLY
COMMUNITY

## 2020-01-01 RX ORDER — PANTOPRAZOLE SODIUM 40 MG/10ML
80 INJECTION, POWDER, LYOPHILIZED, FOR SOLUTION INTRAVENOUS ONCE
Status: COMPLETED | OUTPATIENT
Start: 2020-01-01 | End: 2020-01-01

## 2020-01-01 RX ORDER — CALCIUM CARBONATE 750 MG/1
750 TABLET, CHEWABLE ORAL 3 TIMES DAILY PRN
Status: DISCONTINUED | OUTPATIENT
Start: 2020-01-01 | End: 2020-01-01 | Stop reason: HOSPADM

## 2020-01-01 RX ORDER — METHYLPREDNISOLONE 4 MG/1
TABLET ORAL 2 TIMES DAILY
COMMUNITY

## 2020-01-01 RX ORDER — CARVEDILOL 12.5 MG/1
12.5 TABLET ORAL 2 TIMES DAILY
Qty: 60 TABLET | Refills: 5 | Status: SHIPPED | OUTPATIENT
Start: 2020-01-01

## 2020-01-01 RX ORDER — GUAIFENESIN 600 MG/1
1200 TABLET, EXTENDED RELEASE ORAL 2 TIMES DAILY
COMMUNITY

## 2020-01-01 RX ORDER — LIDOCAINE HYDROCHLORIDE 10 MG/ML
0.5 INJECTION, SOLUTION EPIDURAL; INFILTRATION; INTRACAUDAL; PERINEURAL ONCE AS NEEDED
Status: CANCELLED | OUTPATIENT
Start: 2020-01-01

## 2020-01-01 RX ORDER — SODIUM CHLORIDE, SODIUM LACTATE, POTASSIUM CHLORIDE, CALCIUM CHLORIDE 600; 310; 30; 20 MG/100ML; MG/100ML; MG/100ML; MG/100ML
9 INJECTION, SOLUTION INTRAVENOUS CONTINUOUS
Status: CANCELLED | OUTPATIENT
Start: 2020-01-01

## 2020-01-01 RX ORDER — FUROSEMIDE 20 MG/1
40 TABLET ORAL DAILY
Qty: 60 TABLET | Refills: 0 | Status: SHIPPED | OUTPATIENT
Start: 2020-01-01 | End: 2020-01-01

## 2020-01-01 RX ORDER — PROPOFOL 10 MG/ML
VIAL (ML) INTRAVENOUS AS NEEDED
Status: DISCONTINUED | OUTPATIENT
Start: 2020-01-01 | End: 2020-01-01 | Stop reason: SURG

## 2020-01-01 RX ORDER — BENZONATATE 100 MG/1
100 CAPSULE ORAL 3 TIMES DAILY PRN
COMMUNITY

## 2020-01-01 RX ORDER — PANTOPRAZOLE SODIUM 40 MG/1
40 TABLET, DELAYED RELEASE ORAL
Status: DISCONTINUED | OUTPATIENT
Start: 2020-01-01 | End: 2020-01-01 | Stop reason: HOSPADM

## 2020-01-01 RX ORDER — OXYCODONE HYDROCHLORIDE AND ACETAMINOPHEN 5; 325 MG/1; MG/1
1 TABLET ORAL 4 TIMES DAILY PRN
Status: DISCONTINUED | OUTPATIENT
Start: 2020-01-01 | End: 2020-01-01 | Stop reason: HOSPADM

## 2020-01-01 RX ORDER — CARVEDILOL 12.5 MG/1
12.5 TABLET ORAL 2 TIMES DAILY
Status: DISCONTINUED | OUTPATIENT
Start: 2020-01-01 | End: 2020-01-01 | Stop reason: HOSPADM

## 2020-01-01 RX ORDER — PEG-3350, SODIUM SULFATE, SODIUM CHLORIDE, POTASSIUM CHLORIDE, SODIUM ASCORBATE AND ASCORBIC ACID 7.5-2.691G
1000 KIT ORAL
Status: COMPLETED | OUTPATIENT
Start: 2020-01-01 | End: 2020-01-01

## 2020-01-01 RX ORDER — SODIUM CHLORIDE 9 MG/ML
INJECTION, SOLUTION INTRAVENOUS CONTINUOUS PRN
Status: DISCONTINUED | OUTPATIENT
Start: 2020-01-01 | End: 2020-01-01 | Stop reason: SURG

## 2020-01-01 RX ORDER — DEXTROSE MONOHYDRATE 25 G/50ML
25 INJECTION, SOLUTION INTRAVENOUS
Status: DISCONTINUED | OUTPATIENT
Start: 2020-01-01 | End: 2020-01-01 | Stop reason: HOSPADM

## 2020-01-01 RX ORDER — ONDANSETRON 2 MG/ML
4 INJECTION INTRAMUSCULAR; INTRAVENOUS EVERY 6 HOURS PRN
Status: DISCONTINUED | OUTPATIENT
Start: 2020-01-01 | End: 2020-01-01 | Stop reason: HOSPADM

## 2020-01-01 RX ORDER — PEG-3350, SODIUM SULFATE, SODIUM CHLORIDE, POTASSIUM CHLORIDE, SODIUM ASCORBATE AND ASCORBIC ACID 7.5-2.691G
1000 KIT ORAL ONCE
Status: DISCONTINUED | OUTPATIENT
Start: 2020-01-01 | End: 2020-01-01 | Stop reason: HOSPADM

## 2020-01-01 RX ORDER — NICOTINE POLACRILEX 4 MG
15 LOZENGE BUCCAL
Status: DISCONTINUED | OUTPATIENT
Start: 2020-01-01 | End: 2020-01-01 | Stop reason: HOSPADM

## 2020-01-01 RX ORDER — FAMOTIDINE 10 MG/ML
20 INJECTION, SOLUTION INTRAVENOUS ONCE
Status: CANCELLED | OUTPATIENT
Start: 2020-01-01 | End: 2020-01-01

## 2020-01-01 RX ORDER — FAMOTIDINE 20 MG/1
20 TABLET, FILM COATED ORAL ONCE
Status: CANCELLED | OUTPATIENT
Start: 2020-01-01 | End: 2020-01-01

## 2020-01-01 RX ORDER — SODIUM CHLORIDE 0.9 % (FLUSH) 0.9 %
10 SYRINGE (ML) INJECTION AS NEEDED
Status: DISCONTINUED | OUTPATIENT
Start: 2020-01-01 | End: 2020-01-01 | Stop reason: HOSPADM

## 2020-01-01 RX ORDER — FUROSEMIDE 40 MG/1
40 TABLET ORAL DAILY
Status: DISCONTINUED | OUTPATIENT
Start: 2020-01-01 | End: 2020-01-01

## 2020-01-01 RX ORDER — LIDOCAINE HYDROCHLORIDE 10 MG/ML
INJECTION, SOLUTION EPIDURAL; INFILTRATION; INTRACAUDAL; PERINEURAL AS NEEDED
Status: DISCONTINUED | OUTPATIENT
Start: 2020-01-01 | End: 2020-01-01 | Stop reason: SURG

## 2020-01-01 RX ORDER — PRAVASTATIN SODIUM 40 MG
40 TABLET ORAL NIGHTLY
Status: DISCONTINUED | OUTPATIENT
Start: 2020-01-01 | End: 2020-01-01 | Stop reason: HOSPADM

## 2020-01-01 RX ORDER — SODIUM CHLORIDE 0.9 % (FLUSH) 0.9 %
10 SYRINGE (ML) INJECTION EVERY 12 HOURS SCHEDULED
Status: DISCONTINUED | OUTPATIENT
Start: 2020-01-01 | End: 2020-01-01 | Stop reason: HOSPADM

## 2020-01-01 RX ORDER — MAGNESIUM CARB/ALUMINUM HYDROX 105-160MG
296 TABLET,CHEWABLE ORAL ONCE
Status: COMPLETED | OUTPATIENT
Start: 2020-01-01 | End: 2020-01-01

## 2020-01-01 RX ORDER — GUAIFENESIN 600 MG/1
1200 TABLET, EXTENDED RELEASE ORAL EVERY 12 HOURS SCHEDULED
Status: DISCONTINUED | OUTPATIENT
Start: 2020-01-01 | End: 2020-01-01 | Stop reason: HOSPADM

## 2020-01-01 RX ORDER — CETIRIZINE HYDROCHLORIDE 10 MG/1
5 TABLET ORAL DAILY
Status: DISCONTINUED | OUTPATIENT
Start: 2020-01-01 | End: 2020-01-01 | Stop reason: HOSPADM

## 2020-01-01 RX ORDER — LISINOPRIL 20 MG/1
20 TABLET ORAL DAILY
Status: DISCONTINUED | OUTPATIENT
Start: 2020-01-01 | End: 2020-01-01 | Stop reason: HOSPADM

## 2020-01-01 RX ORDER — NITROGLYCERIN 20 MG/100ML
5-200 INJECTION INTRAVENOUS
Status: DISCONTINUED | OUTPATIENT
Start: 2020-01-01 | End: 2020-01-01 | Stop reason: HOSPADM

## 2020-01-01 RX ORDER — CHOLECALCIFEROL (VITAMIN D3) 125 MCG
5 CAPSULE ORAL NIGHTLY PRN
Status: DISCONTINUED | OUTPATIENT
Start: 2020-01-01 | End: 2020-01-01 | Stop reason: HOSPADM

## 2020-01-01 RX ORDER — FUROSEMIDE 40 MG/1
80 TABLET ORAL DAILY
Status: DISCONTINUED | OUTPATIENT
Start: 2020-01-01 | End: 2020-01-01 | Stop reason: HOSPADM

## 2020-01-01 RX ORDER — FUROSEMIDE 10 MG/ML
80 INJECTION INTRAMUSCULAR; INTRAVENOUS ONCE
Status: COMPLETED | OUTPATIENT
Start: 2020-01-01 | End: 2020-01-01

## 2020-01-01 RX ORDER — PEG-3350, SODIUM SULFATE, SODIUM CHLORIDE, POTASSIUM CHLORIDE, SODIUM ASCORBATE AND ASCORBIC ACID 7.5-2.691G
1000 KIT ORAL ONCE
Status: COMPLETED | OUTPATIENT
Start: 2020-01-01 | End: 2020-01-01

## 2020-01-01 RX ORDER — SODIUM CHLORIDE, SODIUM LACTATE, POTASSIUM CHLORIDE, CALCIUM CHLORIDE 600; 310; 30; 20 MG/100ML; MG/100ML; MG/100ML; MG/100ML
9 INJECTION, SOLUTION INTRAVENOUS CONTINUOUS
Status: DISCONTINUED | OUTPATIENT
Start: 2020-01-01 | End: 2020-01-01 | Stop reason: HOSPADM

## 2020-01-01 RX ORDER — AMOXICILLIN AND CLAVULANATE POTASSIUM 875; 125 MG/1; MG/1
1 TABLET, FILM COATED ORAL 2 TIMES DAILY
COMMUNITY
End: 2020-01-01 | Stop reason: HOSPADM

## 2020-01-01 RX ORDER — SODIUM CHLORIDE 0.9 % (FLUSH) 0.9 %
10 SYRINGE (ML) INJECTION EVERY 12 HOURS SCHEDULED
Status: CANCELLED | OUTPATIENT
Start: 2020-01-01

## 2020-01-01 RX ORDER — FENTANYL CITRATE 50 UG/ML
50 INJECTION, SOLUTION INTRAMUSCULAR; INTRAVENOUS
Status: DISCONTINUED | OUTPATIENT
Start: 2020-01-01 | End: 2020-01-01 | Stop reason: HOSPADM

## 2020-01-01 RX ORDER — SODIUM CHLORIDE 9 MG/ML
50 INJECTION, SOLUTION INTRAVENOUS CONTINUOUS
Status: ACTIVE | OUTPATIENT
Start: 2020-01-01 | End: 2020-01-01

## 2020-01-01 RX ORDER — PANTOPRAZOLE SODIUM 40 MG/10ML
40 INJECTION, POWDER, LYOPHILIZED, FOR SOLUTION INTRAVENOUS EVERY 12 HOURS SCHEDULED
Status: DISCONTINUED | OUTPATIENT
Start: 2020-01-01 | End: 2020-01-01

## 2020-01-01 RX ORDER — SODIUM CHLORIDE 0.9 % (FLUSH) 0.9 %
10 SYRINGE (ML) INJECTION AS NEEDED
Status: CANCELLED | OUTPATIENT
Start: 2020-01-01

## 2020-01-01 RX ORDER — IPRATROPIUM BROMIDE AND ALBUTEROL SULFATE 2.5; .5 MG/3ML; MG/3ML
3 SOLUTION RESPIRATORY (INHALATION) EVERY 6 HOURS PRN
Status: DISCONTINUED | OUTPATIENT
Start: 2020-01-01 | End: 2020-01-01 | Stop reason: HOSPADM

## 2020-01-01 RX ORDER — EPHEDRINE SULFATE 50 MG/ML
5 INJECTION, SOLUTION INTRAVENOUS ONCE AS NEEDED
Status: DISCONTINUED | OUTPATIENT
Start: 2020-01-01 | End: 2020-01-01 | Stop reason: HOSPADM

## 2020-01-01 RX ORDER — PROMETHAZINE HYDROCHLORIDE 25 MG/1
25 TABLET ORAL ONCE AS NEEDED
Status: DISCONTINUED | OUTPATIENT
Start: 2020-01-01 | End: 2020-01-01 | Stop reason: HOSPADM

## 2020-01-01 RX ADMIN — PROPOFOL 50 MG: 10 INJECTION, EMULSION INTRAVENOUS at 11:18

## 2020-01-01 RX ADMIN — MELATONIN TAB 5 MG 5 MG: 5 TAB at 22:01

## 2020-01-01 RX ADMIN — FUROSEMIDE 80 MG: 40 TABLET ORAL at 09:46

## 2020-01-01 RX ADMIN — SODIUM CHLORIDE, PRESERVATIVE FREE 10 ML: 5 INJECTION INTRAVENOUS at 00:21

## 2020-01-01 RX ADMIN — LIDOCAINE HYDROCHLORIDE 50 MG: 10 INJECTION, SOLUTION EPIDURAL; INFILTRATION; INTRACAUDAL; PERINEURAL at 11:18

## 2020-01-01 RX ADMIN — GUAIFENESIN 1200 MG: 600 TABLET, EXTENDED RELEASE ORAL at 12:42

## 2020-01-01 RX ADMIN — PRAVASTATIN SODIUM 40 MG: 40 TABLET ORAL at 20:18

## 2020-01-01 RX ADMIN — GUAIFENESIN 1200 MG: 600 TABLET, EXTENDED RELEASE ORAL at 20:25

## 2020-01-01 RX ADMIN — CETIRIZINE HYDROCHLORIDE 5 MG: 10 TABLET, FILM COATED ORAL at 09:31

## 2020-01-01 RX ADMIN — OXYCODONE HYDROCHLORIDE AND ACETAMINOPHEN 1 TABLET: 5; 325 TABLET ORAL at 21:26

## 2020-01-01 RX ADMIN — GUAIFENESIN 1200 MG: 600 TABLET, EXTENDED RELEASE ORAL at 09:30

## 2020-01-01 RX ADMIN — PANTOPRAZOLE SODIUM 40 MG: 40 INJECTION, POWDER, FOR SOLUTION INTRAVENOUS at 09:31

## 2020-01-01 RX ADMIN — GUAIFENESIN 1200 MG: 600 TABLET, EXTENDED RELEASE ORAL at 09:01

## 2020-01-01 RX ADMIN — SERTRALINE HYDROCHLORIDE 50 MG: 50 TABLET ORAL at 08:01

## 2020-01-01 RX ADMIN — SERTRALINE HYDROCHLORIDE 50 MG: 50 TABLET ORAL at 09:46

## 2020-01-01 RX ADMIN — CARVEDILOL 12.5 MG: 12.5 TABLET, FILM COATED ORAL at 22:46

## 2020-01-01 RX ADMIN — SODIUM CHLORIDE, PRESERVATIVE FREE 10 ML: 5 INJECTION INTRAVENOUS at 21:26

## 2020-01-01 RX ADMIN — MIRTAZAPINE 15 MG: 15 TABLET, FILM COATED ORAL at 00:21

## 2020-01-01 RX ADMIN — GUAIFENESIN 1200 MG: 600 TABLET, EXTENDED RELEASE ORAL at 20:18

## 2020-01-01 RX ADMIN — CARVEDILOL 12.5 MG: 12.5 TABLET, FILM COATED ORAL at 09:30

## 2020-01-01 RX ADMIN — IPRATROPIUM BROMIDE AND ALBUTEROL SULFATE 3 ML: 2.5; .5 SOLUTION RESPIRATORY (INHALATION) at 07:44

## 2020-01-01 RX ADMIN — LISINOPRIL 20 MG: 20 TABLET ORAL at 09:31

## 2020-01-01 RX ADMIN — CALCIUM CARBONATE 750 MG: 750 TABLET ORAL at 14:23

## 2020-01-01 RX ADMIN — PRAVASTATIN SODIUM 40 MG: 40 TABLET ORAL at 20:25

## 2020-01-01 RX ADMIN — PROPOFOL 50 MG: 10 INJECTION, EMULSION INTRAVENOUS at 16:31

## 2020-01-01 RX ADMIN — CETIRIZINE HYDROCHLORIDE 5 MG: 10 TABLET, FILM COATED ORAL at 09:48

## 2020-01-01 RX ADMIN — PROPOFOL 25 MG: 10 INJECTION, EMULSION INTRAVENOUS at 16:36

## 2020-01-01 RX ADMIN — PANTOPRAZOLE SODIUM 40 MG: 40 INJECTION, POWDER, FOR SOLUTION INTRAVENOUS at 09:46

## 2020-01-01 RX ADMIN — PANTOPRAZOLE SODIUM 40 MG: 40 INJECTION, POWDER, FOR SOLUTION INTRAVENOUS at 22:46

## 2020-01-01 RX ADMIN — OXYCODONE HYDROCHLORIDE AND ACETAMINOPHEN 1 TABLET: 5; 325 TABLET ORAL at 10:36

## 2020-01-01 RX ADMIN — OXYCODONE HYDROCHLORIDE AND ACETAMINOPHEN 1 TABLET: 5; 325 TABLET ORAL at 09:02

## 2020-01-01 RX ADMIN — OXYCODONE HYDROCHLORIDE AND ACETAMINOPHEN 1 TABLET: 5; 325 TABLET ORAL at 22:01

## 2020-01-01 RX ADMIN — CARVEDILOL 12.5 MG: 12.5 TABLET, FILM COATED ORAL at 08:01

## 2020-01-01 RX ADMIN — CARVEDILOL 12.5 MG: 12.5 TABLET, FILM COATED ORAL at 08:56

## 2020-01-01 RX ADMIN — PRAVASTATIN SODIUM 40 MG: 40 TABLET ORAL at 22:46

## 2020-01-01 RX ADMIN — SODIUM CHLORIDE 50 ML/HR: 9 INJECTION, SOLUTION INTRAVENOUS at 03:31

## 2020-01-01 RX ADMIN — SODIUM CHLORIDE, PRESERVATIVE FREE 10 ML: 5 INJECTION INTRAVENOUS at 08:02

## 2020-01-01 RX ADMIN — CETIRIZINE HYDROCHLORIDE 5 MG: 10 TABLET, FILM COATED ORAL at 08:57

## 2020-01-01 RX ADMIN — PANTOPRAZOLE SODIUM 40 MG: 40 INJECTION, POWDER, FOR SOLUTION INTRAVENOUS at 08:57

## 2020-01-01 RX ADMIN — OXYCODONE HYDROCHLORIDE AND ACETAMINOPHEN 1 TABLET: 5; 325 TABLET ORAL at 18:18

## 2020-01-01 RX ADMIN — OXYCODONE HYDROCHLORIDE AND ACETAMINOPHEN 1 TABLET: 5; 325 TABLET ORAL at 08:58

## 2020-01-01 RX ADMIN — PANTOPRAZOLE SODIUM 40 MG: 40 TABLET, DELAYED RELEASE ORAL at 17:58

## 2020-01-01 RX ADMIN — PANTOPRAZOLE SODIUM 40 MG: 40 INJECTION, POWDER, FOR SOLUTION INTRAVENOUS at 21:26

## 2020-01-01 RX ADMIN — IPRATROPIUM BROMIDE AND ALBUTEROL SULFATE 3 ML: 2.5; .5 SOLUTION RESPIRATORY (INHALATION) at 07:05

## 2020-01-01 RX ADMIN — INSULIN LISPRO 3 UNITS: 100 INJECTION, SOLUTION INTRAVENOUS; SUBCUTANEOUS at 17:53

## 2020-01-01 RX ADMIN — IPRATROPIUM BROMIDE AND ALBUTEROL SULFATE 3 ML: 2.5; .5 SOLUTION RESPIRATORY (INHALATION) at 20:12

## 2020-01-01 RX ADMIN — SODIUM CHLORIDE, PRESERVATIVE FREE 10 ML: 5 INJECTION INTRAVENOUS at 09:47

## 2020-01-01 RX ADMIN — PANTOPRAZOLE SODIUM 40 MG: 40 INJECTION, POWDER, FOR SOLUTION INTRAVENOUS at 09:01

## 2020-01-01 RX ADMIN — PROPOFOL 25 MG: 10 INJECTION, EMULSION INTRAVENOUS at 16:49

## 2020-01-01 RX ADMIN — OXYCODONE HYDROCHLORIDE AND ACETAMINOPHEN 1 TABLET: 5; 325 TABLET ORAL at 09:40

## 2020-01-01 RX ADMIN — SODIUM CHLORIDE, POTASSIUM CHLORIDE, SODIUM LACTATE AND CALCIUM CHLORIDE 9 ML/HR: 600; 310; 30; 20 INJECTION, SOLUTION INTRAVENOUS at 14:31

## 2020-01-01 RX ADMIN — CARVEDILOL 12.5 MG: 12.5 TABLET, FILM COATED ORAL at 00:21

## 2020-01-01 RX ADMIN — CARVEDILOL 12.5 MG: 12.5 TABLET, FILM COATED ORAL at 21:26

## 2020-01-01 RX ADMIN — EPHEDRINE SULFATE 15 MG: 50 INJECTION INTRAMUSCULAR; INTRAVENOUS; SUBCUTANEOUS at 16:42

## 2020-01-01 RX ADMIN — FUROSEMIDE 40 MG: 40 TABLET ORAL at 08:57

## 2020-01-01 RX ADMIN — PROPOFOL 50 MG: 10 INJECTION, EMULSION INTRAVENOUS at 16:42

## 2020-01-01 RX ADMIN — PANTOPRAZOLE SODIUM 40 MG: 40 INJECTION, POWDER, FOR SOLUTION INTRAVENOUS at 20:25

## 2020-01-01 RX ADMIN — FUROSEMIDE 80 MG: 10 INJECTION, SOLUTION INTRAMUSCULAR; INTRAVENOUS at 22:24

## 2020-01-01 RX ADMIN — LISINOPRIL 20 MG: 20 TABLET ORAL at 09:01

## 2020-01-01 RX ADMIN — MIRTAZAPINE 15 MG: 15 TABLET, FILM COATED ORAL at 22:46

## 2020-01-01 RX ADMIN — FUROSEMIDE 40 MG: 40 TABLET ORAL at 09:40

## 2020-01-01 RX ADMIN — EPHEDRINE SULFATE 10 MG: 50 INJECTION INTRAMUSCULAR; INTRAVENOUS; SUBCUTANEOUS at 16:37

## 2020-01-01 RX ADMIN — PRAVASTATIN SODIUM 40 MG: 40 TABLET ORAL at 22:02

## 2020-01-01 RX ADMIN — INSULIN LISPRO 2 UNITS: 100 INJECTION, SOLUTION INTRAVENOUS; SUBCUTANEOUS at 13:03

## 2020-01-01 RX ADMIN — MIRTAZAPINE 15 MG: 15 TABLET, FILM COATED ORAL at 20:25

## 2020-01-01 RX ADMIN — CARVEDILOL 12.5 MG: 12.5 TABLET, FILM COATED ORAL at 09:01

## 2020-01-01 RX ADMIN — SODIUM CHLORIDE, PRESERVATIVE FREE 10 ML: 5 INJECTION INTRAVENOUS at 08:58

## 2020-01-01 RX ADMIN — LISINOPRIL 20 MG: 20 TABLET ORAL at 08:56

## 2020-01-01 RX ADMIN — SODIUM CHLORIDE, PRESERVATIVE FREE 10 ML: 5 INJECTION INTRAVENOUS at 22:46

## 2020-01-01 RX ADMIN — PANTOPRAZOLE SODIUM 40 MG: 40 INJECTION, POWDER, FOR SOLUTION INTRAVENOUS at 22:03

## 2020-01-01 RX ADMIN — OXYCODONE HYDROCHLORIDE AND ACETAMINOPHEN 1 TABLET: 5; 325 TABLET ORAL at 19:15

## 2020-01-01 RX ADMIN — PRAVASTATIN SODIUM 40 MG: 40 TABLET ORAL at 21:26

## 2020-01-01 RX ADMIN — SODIUM CHLORIDE, PRESERVATIVE FREE 10 ML: 5 INJECTION INTRAVENOUS at 20:26

## 2020-01-01 RX ADMIN — MIRTAZAPINE 15 MG: 15 TABLET, FILM COATED ORAL at 22:02

## 2020-01-01 RX ADMIN — POLYETHYLENE GLYCOL 3350, SODIUM SULFATE, SODIUM CHLORIDE, POTASSIUM CHLORIDE, ASCORBIC ACID, SODIUM ASCORBATE 1000 ML: KIT at 04:30

## 2020-01-01 RX ADMIN — MIRTAZAPINE 15 MG: 15 TABLET, FILM COATED ORAL at 20:18

## 2020-01-01 RX ADMIN — LISINOPRIL 20 MG: 20 TABLET ORAL at 08:01

## 2020-01-01 RX ADMIN — OXYCODONE HYDROCHLORIDE AND ACETAMINOPHEN 1 TABLET: 5; 325 TABLET ORAL at 15:09

## 2020-01-01 RX ADMIN — GUAIFENESIN 1200 MG: 600 TABLET, EXTENDED RELEASE ORAL at 09:47

## 2020-01-01 RX ADMIN — LISINOPRIL 20 MG: 20 TABLET ORAL at 09:48

## 2020-01-01 RX ADMIN — CETIRIZINE HYDROCHLORIDE 5 MG: 10 TABLET, FILM COATED ORAL at 09:01

## 2020-01-01 RX ADMIN — PANTOPRAZOLE SODIUM 40 MG: 40 INJECTION, POWDER, FOR SOLUTION INTRAVENOUS at 08:01

## 2020-01-01 RX ADMIN — IPRATROPIUM BROMIDE AND ALBUTEROL SULFATE 3 ML: 2.5; .5 SOLUTION RESPIRATORY (INHALATION) at 09:22

## 2020-01-01 RX ADMIN — IPRATROPIUM BROMIDE AND ALBUTEROL SULFATE 3 ML: 2.5; .5 SOLUTION RESPIRATORY (INHALATION) at 03:38

## 2020-01-01 RX ADMIN — CARVEDILOL 12.5 MG: 12.5 TABLET, FILM COATED ORAL at 09:48

## 2020-01-01 RX ADMIN — Medication 296 ML: at 11:06

## 2020-01-01 RX ADMIN — FUROSEMIDE 80 MG: 40 TABLET ORAL at 09:01

## 2020-01-01 RX ADMIN — CARVEDILOL 12.5 MG: 12.5 TABLET, FILM COATED ORAL at 20:25

## 2020-01-01 RX ADMIN — SERTRALINE HYDROCHLORIDE 50 MG: 50 TABLET ORAL at 08:56

## 2020-01-01 RX ADMIN — POLYETHYLENE GLYCOL 3350, SODIUM SULFATE, SODIUM CHLORIDE, POTASSIUM CHLORIDE, ASCORBIC ACID, SODIUM ASCORBATE 1000 ML: KIT at 18:49

## 2020-01-01 RX ADMIN — OXYCODONE HYDROCHLORIDE AND ACETAMINOPHEN 1 TABLET: 5; 325 TABLET ORAL at 14:38

## 2020-01-01 RX ADMIN — CETIRIZINE HYDROCHLORIDE 5 MG: 10 TABLET, FILM COATED ORAL at 08:01

## 2020-01-01 RX ADMIN — PRAVASTATIN SODIUM 40 MG: 40 TABLET ORAL at 00:21

## 2020-01-01 RX ADMIN — SODIUM CHLORIDE, PRESERVATIVE FREE 10 ML: 5 INJECTION INTRAVENOUS at 22:03

## 2020-01-01 RX ADMIN — FUROSEMIDE 40 MG: 40 TABLET ORAL at 08:01

## 2020-01-01 RX ADMIN — CARVEDILOL 12.5 MG: 12.5 TABLET, FILM COATED ORAL at 20:18

## 2020-01-01 RX ADMIN — GUAIFENESIN 1200 MG: 600 TABLET, EXTENDED RELEASE ORAL at 22:02

## 2020-01-01 RX ADMIN — SODIUM CHLORIDE: 9 INJECTION, SOLUTION INTRAVENOUS at 11:13

## 2020-01-01 RX ADMIN — EPHEDRINE SULFATE 15 MG: 50 INJECTION INTRAMUSCULAR; INTRAVENOUS; SUBCUTANEOUS at 16:51

## 2020-01-01 RX ADMIN — MIRTAZAPINE 15 MG: 15 TABLET, FILM COATED ORAL at 01:00

## 2020-01-01 RX ADMIN — SERTRALINE HYDROCHLORIDE 50 MG: 50 TABLET ORAL at 09:31

## 2020-01-01 RX ADMIN — POLYETHYLENE GLYCOL 3350, SODIUM SULFATE, SODIUM CHLORIDE, POTASSIUM CHLORIDE, ASCORBIC ACID, SODIUM ASCORBATE 1000 ML: KIT at 17:44

## 2020-01-01 RX ADMIN — PANTOPRAZOLE SODIUM 80 MG: 40 INJECTION, POWDER, FOR SOLUTION INTRAVENOUS at 22:26

## 2020-01-01 RX ADMIN — GUAIFENESIN 1200 MG: 600 TABLET, EXTENDED RELEASE ORAL at 22:46

## 2020-01-01 RX ADMIN — OXYCODONE HYDROCHLORIDE AND ACETAMINOPHEN 1 TABLET: 5; 325 TABLET ORAL at 22:52

## 2020-01-01 RX ADMIN — SERTRALINE HYDROCHLORIDE 50 MG: 50 TABLET ORAL at 09:01

## 2020-01-01 RX ADMIN — CARVEDILOL 12.5 MG: 12.5 TABLET, FILM COATED ORAL at 22:02

## 2020-01-01 RX ADMIN — PHENYLEPHRINE HYDROCHLORIDE 160 MCG: 10 INJECTION, SOLUTION INTRAMUSCULAR; INTRAVENOUS; SUBCUTANEOUS at 11:26

## 2020-01-08 NOTE — TELEPHONE ENCOUNTER
"Pts daughter took her to [facility] ER this morning for chest pain and SOB. Per pts daughter the ER workup was negative for MI or pneumonia. They gave her a breathing treatment in the ER that helped \"a little bit\". I have reached out to [facility] they are faxing records today. The pt states she feels a little better now. She has not tried any Nitro tablets and has taken an extra Lasix a few days ago for SOB. Daughter reports BP at ER before going home was 176/101 without any medication. They do not monitor BP and HR at home. Verified she is taking all medications as prescribed in [facility]. I will forward records to [facility] to review once received.   "

## 2020-01-08 NOTE — TELEPHONE ENCOUNTER
Since the checkup in the ER was fine there is no need for further cardiac testing at this time.  Follow-up with primary care physician for monitoring of blood pressure and further changes in medications if needed.

## 2020-01-08 NOTE — TELEPHONE ENCOUNTER
Tried to call daughter with [facility] recommendations above. Her VM was full, no other number listed on chart and MyChart is pending. Will await pt return call.

## 2020-03-25 NOTE — TELEPHONE ENCOUNTER
Called pt due to Latitude monitor isn't reading.  Daughter said she will check all connections when she gets home.

## 2020-03-26 NOTE — TELEPHONE ENCOUNTER
Called and spoke to daughter today to let her know that monitor still isn't reading.  She isn't there with Mr Zhou and she will have to check when someone is there with her.

## 2020-05-19 PROBLEM — M79.604 PAIN IN BOTH LOWER EXTREMITIES: Status: RESOLVED | Noted: 2019-03-27 | Resolved: 2020-01-01

## 2020-05-19 PROBLEM — J90 PLEURAL EFFUSION: Status: RESOLVED | Noted: 2019-03-22 | Resolved: 2020-01-01

## 2020-05-19 PROBLEM — M79.605 PAIN IN BOTH LOWER EXTREMITIES: Status: RESOLVED | Noted: 2019-03-27 | Resolved: 2020-01-01

## 2020-05-19 NOTE — PROGRESS NOTES
[facility] Cardiology    Encounter Date:2020    Patient ID: Kandace Zhou is a 87 y.o. female.  : 1933     PCP: Tejinder Acuna MD       Chief Complaint: Atrial Fibrillation      PROBLEM LIST:  1. Permanent atrial fibrillation:  a. CHADS-VASc = 5 (HTN, Age > 75, DM, Female), on Xarelto 20 mg.  2. Tachy-louann syndrome:  a. AV node ablation, 2019.  b. PPM implant, 2019: Successful implantation of a [employer] pacemaker.  3. Cardiomyopathy:  a. Echo, 2019: EF 40-45%.   4. Hypertension  5. Hyperlipidemia  6. DM2     History of Present Illness  Patient presents today for follow-up with a history of permanent atrial fibrillation, tachybrady syndrome s/p AV node ablation and pacemaker, and cardiac risk factors. Since last visit, she has had multiple ER visits for CHF symptoms, including fluid buildup and shortness of breath. Patient is on Lasix 40 mg daily, with extra as needed for fluid buildup. She has been taking it twice daily since last  due to fluid overload. Patient has questions about dietary restrictions, including whether she can have chocolate and ice-cold drinks.  With adjusted dose of Lasix her symptoms have improved.  She has no current chest pain dyspnea or edema.  She however complains of feeling tired and fatigued.    Allergies   Allergen Reactions   • Sulfa Antibiotics Other (See Comments)     CANNOT REMEMBER   • Tetanus Toxoids Other (See Comments)     PT. CANNOT REMEMBER   • Tuberculin Tests Other (See Comments)     PT. CANNOT REMEMBER   • Lortab [Hydrocodone-Acetaminophen] Rash         Current Outpatient Medications:   •  alendronate (FOSAMAX) 70 MG tablet, Take 70 mg by mouth 1 (One) Time Per Week., Disp: , Rfl: 4  •  carvedilol (COREG) 12.5 MG tablet, Take 1 tablet by mouth 2 (Two) Times a Day., Disp: 60 tablet, Rfl: 5  •  Cholecalciferol (VITAMIN D3) 1000 units capsule, Take 1,000 Units by mouth Daily., Disp: , Rfl:    •  Empagliflozin (Jardiance) 25 MG tablet, Take  by mouth Daily., Disp: , Rfl:   •  furosemide (LASIX) 20 MG tablet, Take 1 tablet by mouth 2 (Two) Times a Day. May take additional tablet as needed for increased shortness of breath, 3lb wt gain in 24 hours, Disp: 90 tablet, Rfl: 0  •  lisinopril (PRINIVIL,ZESTRIL) 20 MG tablet, Take 1 tablet by mouth Daily., Disp: 90 tablet, Rfl: 1  •  loratadine (CLARITIN) 10 MG tablet, Take 10 mg by mouth Daily., Disp: , Rfl:   •  metFORMIN (GLUCOPHAGE) 1000 MG tablet, Take 1,000 mg by mouth 2 (Two) Times a Day With Meals., Disp: , Rfl:   •  mirtazapine (REMERON) 15 MG tablet, Take 15 mg by mouth Every Night., Disp: , Rfl:   •  nitroglycerin (NITROSTAT) 0.4 MG SL tablet, Place 1 tablet under the tongue Every 5 (Five) Minutes As Needed for Chest Pain. Take no more than 3 doses in 15 minutes., Disp: 25 tablet, Rfl: 1  •  omeprazole (priLOSEC) 20 MG capsule, Take 20 mg by mouth 2 (Two) Times a Day., Disp: , Rfl:   •  oxyCODONE-acetaminophen (PERCOCET) 5-325 MG per tablet, Take 1 tablet by mouth 4 (Four) Times a Day., Disp: , Rfl: 0  •  potassium chloride (K-DUR,KLOR-CON) 20 MEQ CR tablet, Take 20 mEq by mouth Daily., Disp: , Rfl:   •  pravastatin (PRAVACHOL) 40 MG tablet, Take 40 mg by mouth Daily., Disp: , Rfl:   •  sertraline (ZOLOFT) 50 MG tablet, Take 50 mg by mouth Daily., Disp: , Rfl:   •  XARELTO 20 MG tablet, Take 20 mg by mouth Daily., Disp: , Rfl: 2    The following portions of the patient's history were reviewed and updated as appropriate: allergies, current medications, past family history, past medical history, past social history, past surgical history and problem list.    ROS  Review of Systems   Constitution: Negative for chills, fever, fatigue, generalized weakness.   Cardiovascular: Negative for chest pain, claudication, palpitations, orthopnea, and syncope. Positive for leg swelling and dyspnea on exertion  Respiratory: Negative for cough and wheezing. Positive  "for shortness of breath.  HENT: Negative for ear pain, nosebleeds, and tinnitus.  Gastrointestinal: Negative for abdominal pain, constipation, diarrhea, nausea and vomiting.   Genitourinary: No urinary symptoms.  Musculoskeletal: Negative for muscle cramps.  Neurological: Negative for dizziness, headaches, loss of balance, numbness, and symptoms of stroke.  Psychiatric: Normal mental status.     All other systems reviewed and are negative.    Objective:     BP 94/54 (BP Location: Left arm, Patient Position: Sitting)   Pulse 88   Ht 149.9 cm (59\")   Wt 44.5 kg (98 lb)   BMI 19.79 kg/m²        Physical Exam  Constitutional: Patient appears well-developed and well-nourished.   HENT: HEENT exam unremarkable.   Neck: Neck supple. No JVD present. No carotid bruits.   Cardiovascular: Normal rate, regular rhythm and normal heart sounds. No murmur heard.   2+ symmetric pulses.   Pulmonary/Chest: Breath sounds normal. Does not exhibit tenderness.   Abdominal: Abdomen benign.   Musculoskeletal: Does not exhibit edema.   Neurological: Neurological exam unremarkable.   Vitals reviewed.    Lab Review:   01/21/2020:    TG 85  HDL 55  LDL 66  CMP: Glu 96, BUN 31 (H), Creat 1.00, GFR 52 (L), Na 136 (L), otherwise normal CMP     Procedures     Manual device interrogation, 05/19/2020: Normal functioning single chamber [employer] pacemaker with 8 years of battery life.  100% V paced.   No events  No changes.        Assessment:      Diagnosis Plan   1. Permanent atrial fibrillation status post AV node ablation. Continue Xarelto 20 mg daily for stroke prophylaxis.   2. Dilated cardiomyopathy (CMS/HCC)  Decrease carvedilol from 25 to 12.5 mg BID to avoid low blood pressure.   3. Tachy-louann syndrome (CMS/HCC)  Normal functioning device. Repeat check in 6 months.   4. Essential hypertension  Decrease carvedilol from 25 to 12.5 mg BID. Continue all other current medications.   5. Hyperlipidemia LDL goal <100  " Well-controlled, continue pravastatin 40 mg.   6. Acute on chronic CHF Continue Lasix 40 mg BID for fluid overload. Decrease sodium intake.     Plan:   Decrease carvedilol from 25 to 12.5 mg BID due to fatigue symptoms and borderline blood pressure..  Continue Lasix 40 mg BID for fluid overload.  Check BMP next week.  Continue all other current medications.   No obvious benefit in dietary restrictions other than low-sodium diet.  Okay to eat chocolate and occasional ice chips.  FU in 1 MO, sooner as needed.  Thank you for allowing us to participate in the care of your patient.     Scribed for Connor Brand MD by Casi Cordoba. 5/19/2020  16:31      I, Connor Brand MD, personally performed the services described in this documentation as scribed by the above named individual in my presence, and it is both accurate and complete.  5/19/2020  16:31        Please note that portions of this note may have been completed with a voice recognition program. Efforts were made to edit the dictations, but occasionally words are mistranscribed.

## 2020-06-02 NOTE — TELEPHONE ENCOUNTER
Pt POA calling asking if we have received lab work results from [facility]. We have not. Waiting on BMP ot address kidney function w Lasix 80mg QD. Will call and retrieve records. Will address once received.

## 2020-06-05 NOTE — TELEPHONE ENCOUNTER
Called and spoke with daughter. Daughter states pt has been taking 80 of Lasix BID. Per AA LEXII, decrease Lasix to 40 mg BID. Informed daughter of this. She verbalized understanding and agreeable with plan.

## 2020-06-30 NOTE — PROGRESS NOTES
[facility] Cardiology    Encounter Date: 2020    Patient ID: Kandace Zhou is a 87 y.o. female.  : 1933     PCP: Tejinder Acuna MD       Chief Complaint: Permanent atrial fibrillation (CMS/HCC)      PROBLEM LIST:  1. Permanent atrial fibrillation:  a. CHADS-VASc = 5 (HTN, Age > 75, DM, Female), on Xarelto 20 mg.  2. Tachy-louann syndrome:  a. AV node ablation, 2019.  b. PPM implant, 2019: Single chamber [employer] pacemaker.  3. Cardiomyopathy:  a. Echo, 2019: EF 40-45%.   4. Hypertension  5. Hyperlipidemia  6. DM2  7. Arthritis.     History of Present Illness  Patient presents today for a one month follow-up with a history of permanent atrial fibrillation, DCM, tachy-louann syndrome s/p pacemaker, CHF, and cardiac risk factors. Since last visit, she has been feeling well overall from a cardiovascular standpoint. She reports some discomfort in her pacemaker area. Patient has been trying to gain some weight but has been unsuccessful. She lacks appetite. Patient denies any leg swelling, but reports some leg discomfort. Patient's daughter reports patient has been using more O2 lately. Patient denies chest pain, palpitations, edema, dizziness, and syncope.      Allergies   Allergen Reactions   • Sulfa Antibiotics Other (See Comments)     CANNOT REMEMBER   • Tetanus Toxoids Other (See Comments)     PT. CANNOT REMEMBER   • Tuberculin Tests Other (See Comments)     PT. CANNOT REMEMBER   • Lortab [Hydrocodone-Acetaminophen] Rash         Current Outpatient Medications:   •  alendronate (FOSAMAX) 70 MG tablet, Take 70 mg by mouth 1 (One) Time Per Week., Disp: , Rfl: 4  •  carvedilol (COREG) 12.5 MG tablet, Take 1 tablet by mouth 2 (Two) Times a Day., Disp: 60 tablet, Rfl: 5  •  Cholecalciferol (VITAMIN D3) 1000 units capsule, Take 1,000 Units by mouth Daily., Disp: , Rfl:   •  Empagliflozin (Jardiance) 25 MG tablet, Take  by mouth Daily., Disp: ,  Rfl:   •  furosemide (LASIX) 20 MG tablet, Take 1 tablet by mouth 2 (Two) Times a Day. May take additional tablet as needed for increased shortness of breath, 3lb wt gain in 24 hours, Disp: 90 tablet, Rfl: 0  •  lisinopril (PRINIVIL,ZESTRIL) 20 MG tablet, Take 1 tablet by mouth Daily., Disp: 90 tablet, Rfl: 1  •  loratadine (CLARITIN) 10 MG tablet, Take 10 mg by mouth Daily., Disp: , Rfl:   •  metFORMIN (GLUCOPHAGE) 1000 MG tablet, Take 1,000 mg by mouth 2 (Two) Times a Day With Meals., Disp: , Rfl:   •  mirtazapine (REMERON) 15 MG tablet, Take 15 mg by mouth Every Night., Disp: , Rfl:   •  nitroglycerin (NITROSTAT) 0.4 MG SL tablet, Place 1 tablet under the tongue Every 5 (Five) Minutes As Needed for Chest Pain. Take no more than 3 doses in 15 minutes., Disp: 25 tablet, Rfl: 1  •  omeprazole (priLOSEC) 20 MG capsule, Take 20 mg by mouth 2 (Two) Times a Day., Disp: , Rfl:   •  oxyCODONE-acetaminophen (PERCOCET) 5-325 MG per tablet, Take 1 tablet by mouth 4 (Four) Times a Day., Disp: , Rfl: 0  •  potassium chloride (K-DUR,KLOR-CON) 20 MEQ CR tablet, Take 20 mEq by mouth Daily., Disp: , Rfl:   •  pravastatin (PRAVACHOL) 40 MG tablet, Take 40 mg by mouth Daily., Disp: , Rfl:   •  sertraline (ZOLOFT) 50 MG tablet, Take 50 mg by mouth Daily., Disp: , Rfl:   •  XARELTO 20 MG tablet, Take 20 mg by mouth Daily., Disp: , Rfl: 2    The following portions of the patient's history were reviewed and updated as appropriate: allergies, current medications, past family history, past medical history, past social history, past surgical history and problem list.    ROS  Review of Systems   Constitution: Negative for chills, fever. Positive for fatigue.  Cardiovascular: Negative for chest pain, claudication, dyspnea on exertion, leg swelling, palpitations, orthopnea, and syncope.   Respiratory: Positive for shortness of breath   HENT: Negative for ear pain, nosebleeds, and tinnitus.  Gastrointestinal: Negative for abdominal pain,  "constipation, diarrhea, nausea and vomiting.   Genitourinary: No urinary symptoms.  Musculoskeletal: Positive for myalgias.  Neurological: Negative for dizziness, headaches, loss of balance, numbness, and symptoms of stroke.  Psychiatric: Normal mental status.     All other systems reviewed and are negative.        Objective:     /70 (BP Location: Left arm, Patient Position: Sitting)   Pulse 83   Ht 149.9 cm (59\")   Wt 45.4 kg (100 lb)   SpO2 98%   BMI 20.20 kg/m²      Physical Exam  Constitutional: Patient appears well-developed and well-nourished.   HENT: HEENT exam unremarkable.   Neck: Neck supple. No JVD present. No carotid bruits.   Cardiovascular: Normal rate, regular rhythm and normal heart sounds. No murmur heard.   2+ symmetric pulses.   Pulmonary/Chest: Breath sounds normal. Does not exhibit tenderness.   Abdominal: Abdomen benign.   Musculoskeletal: Does not exhibit edema.   Neurological: Neurological exam unremarkable.   Vitals reviewed.    Lab Review:   Lab Results   Component Value Date    GLUCOSE 125 (H) 06/09/2019    BUN 20 06/09/2019    CREATININE 1.03 (H) 06/09/2019    EGFRIFNONA 51 (L) 06/09/2019    BCR 19.4 06/09/2019    K 4.1 06/09/2019    CO2 22.0 06/09/2019    CALCIUM 9.4 06/09/2019    ALBUMIN 3.90 06/09/2019    AST 47 (H) 06/09/2019    ALT 19 06/09/2019      Lab Results   Component Value Date    WBC 7.06 06/09/2019    RBC 4.11 06/09/2019    HGB 12.0 06/09/2019    HCT 39.3 06/09/2019    MCV 95.6 06/09/2019     06/09/2019        Procedures       Assessment:      Diagnosis Plan   1. Permanent atrial fibrillation (CMS/HCC)  Continue Xarelto for stroke prophylaxis.   2. Dilated cardiomyopathy (CMS/HCC)  Continue carvedilol, lisinopril. EF 40-45% in March 2019.    3. Tachy-louann syndrome (CMS/HCC)  Normal device function in May 2020. Repeat check in 6 months.   4. Essential hypertension  Well-controlled. Decrease Lasix 20 mg to one tablet every other day, with one extra tablet " daily as needed for leg swelling. Continue all other current medications.   5. Hyperlipidemia LDL goal <100  Monitored by PCP, continue pravastatin 40 mg.     Plan:   Stable cardiac status overall.  Patient may decrease Lasix to 20 mg every other day, with an extra tablet daily as needed for leg swelling.  Patient encouraged to increase her caloric intake for weight gain.  Continue all other current medications.   FU in 6 MO with device check, sooner as needed.  Thank you for allowing us to participate in the care of your patient.     Scribed for Connor Brand MD by Casi Cordoba. 6/30/2020  10:39     I, Connor Brand MD, personally performed the services described in this documentation as scribed by the above named individual in my presence, and it is both accurate and complete.  6/30/2020  11:57        Please note that portions of this note may have been completed with a voice recognition program. Efforts were made to edit the dictations, but occasionally words are mistranscribed.

## 2020-07-23 NOTE — TELEPHONE ENCOUNTER
Called pt due to [employer] home monitor isn't reading.  She will have someone check all connections and to call to check to see if reading.

## 2020-10-23 NOTE — TELEPHONE ENCOUNTER
Called pt due to [employer] home monitor didn't send in scheduled reading.  Daughter said she is in the hospital at this time.

## 2020-10-30 PROBLEM — D64.9 SYMPTOMATIC ANEMIA: Status: ACTIVE | Noted: 2020-01-01

## 2020-10-30 PROBLEM — K92.2 UPPER GI BLEED: Status: ACTIVE | Noted: 2020-01-01

## 2020-10-30 PROBLEM — I50.31 ACUTE DIASTOLIC HEART FAILURE (HCC): Status: ACTIVE | Noted: 2020-01-01

## 2020-11-01 NOTE — BRIEF OP NOTE
ESOPHAGOGASTRODUODENOSCOPY  Progress Note    Kandace Zhou  11/1/2020    EGD is normal.    >> Plan colonoscopy tomorrow.    Mark I. Brunner, MD     Date: 11/1/2020  Time: 11:31 EST

## 2020-11-01 NOTE — ANESTHESIA PREPROCEDURE EVALUATION
Anesthesia Evaluation                  Airway   Mallampati: I  TM distance: >3 FB  Neck ROM: full  Dental      Pulmonary    (+) pleural effusion, shortness of breath,   Cardiovascular     ECG reviewed    (+) pacemaker pacemaker, hypertension, dysrhythmias,     ROS comment: Ef 48    Neuro/Psych  (+) CVA, weakness,     GI/Hepatic/Renal/Endo    (+)  GERD, GI bleeding , renal disease, diabetes mellitus,     Musculoskeletal     Abdominal    Substance History      OB/GYN          Other                        Anesthesia Plan    ASA 4     general     intravenous induction

## 2020-11-01 NOTE — PROGRESS NOTES
[facility] Medicine Services  PROGRESS NOTE    Patient Name: Kandace Zhou  : 1933  MRN: [id number]    Date of Admission: 10/30/2020  Primary Care Physician: Vanessa Trujillo MD    Subjective   Subjective     CC:  Weak and unable to get out of bed    HPI: Ongoing dyspnea, weak cough non productive, breathing treatments help .  EGD this morning was neg for source of blood loss.     Review of Systems  No fevers.  No sweats.   No palpitations, no chest pain  Mild abd pain , no nausea  Swelling of ankles much better per dtr    Objective   Objective     Vital Signs:   Temp:  [97.4 °F (36.3 °C)-98.2 °F (36.8 °C)] 97.8 °F (36.6 °C)  Heart Rate:  [74-84] 77  Resp:  [16-18] 16  BP: (117-159)/(68-94) 159/94        Physical Exam:  Gen:  Elderly frail-looking woman o n nc oxygen, asleep.  Dtr present.  She wakes easily.    Neuro: alert and oriented, clear speech, follows commands, grossly nonfocal other than [place].   HEENT:  NC/AT PERRL, OP benign  Neck:  Supple, no LAD  Heart RRR no murmur, rub, or gallop  Lungs decreased throughout.  No W R R. Coughs with encouragement:  Rhonchi but nonproductive  Abd:  Soft, nontender, no rebound or guarding, pos BS  Extrem:  No c/c/e      Results Reviewed:  Results from last 7 days   Lab Units 10/31/20  1920 10/31/20  0842 10/31/20  0538  10/30/20  1717   WBC 10*3/mm3  --   --  8.91  --  11.04*   HEMOGLOBIN g/dL 8.3* 8.3* 7.9*  7.9*   < > 8.2*   HEMATOCRIT % 29.5* 29.3* 27.6*  27.6*   < > 28.9*   PLATELETS 10*3/mm3  --   --  230  --  292    < > = values in this interval not displayed.     Results from last 7 days   Lab Units 10/31/20  0538 10/30/20  1717   SODIUM mmol/L 140 142   POTASSIUM mmol/L 4.1 5.1   CHLORIDE mmol/L 99 105   CO2 mmol/L 32.0* 26.0   BUN mg/dL 42* 41*   CREATININE mg/dL 1.09* 1.04*   GLUCOSE mg/dL 137* 193*   CALCIUM mg/dL 8.8 9.2   ALT (SGPT) U/L  --  71*   AST (SGOT) U/L  --  82*   TROPONIN T ng/mL  --  <0.010   PROBNP pg/mL  --   5,538.0*     Estimated Creatinine Clearance: 25.8 mL/min (A) (by C-G formula based on SCr of 1.09 mg/dL (H)).    Microbiology Results Abnormal     Procedure Component Value - Date/Time    COVID PRE-OP / PRE-PROCEDURE SCREENING ORDER (NO ISOLATION) - Swab, Nasal Cavity [[id number]]  (Normal) Collected: 10/31/20 0114    Lab Status: Final result Specimen: Swab from Nasal Cavity Updated: 10/31/20 0203    Narrative:      The following orders were created for panel order COVID PRE-OP / PRE-PROCEDURE SCREENING ORDER (NO ISOLATION) - Swab, Nasal Cavity.  Procedure                               Abnormality         Status                     ---------                               -----------         ------                     COVID-19, ABBOTT IN-HOUS...[[id number]]  Normal              Final result                 Please view results for these tests on the individual orders.    COVID-19, ABBOTT IN-HOUSE,NP Swab (NO TRANSPORT MEDIA) 2 HR TAT - Swab, Nasal Cavity [[id number]]  (Normal) Collected: 10/31/20 0114    Lab Status: Final result Specimen: Swab from Nasal Cavity Updated: 10/31/20 0203     COVID19 Not Detected    Narrative:      Fact sheet for providers: https://www.fda.gov/media/422801/download     Fact sheet for patients: https://www.fda.gov/media/265292/download          Imaging Results (Last 24 Hours)     ** No results found for the last 24 hours. **          Results for orders placed during the hospital encounter of 10/30/20   Transthoracic Echo Complete With Contrast if Necessary Per Protocol    Narrative · Left ventricular ejection fraction appears to be 51 - 55%.  · The right atrial cavity is severely dilated.  · There is mainly affecting the non-coronary cusp(s).  · Mild to moderate mitral valve regurgitation is present.  · Estimated right ventricular systolic pressure from tricuspid   regurgitation is moderately elevated (45-55 mmHg).  · Moderate tricuspid valve regurgitation is present.  · The left atrial cavity  is moderately dilated.          I have reviewed the medications:  Scheduled Meds:carvedilol, 12.5 mg, Oral, BID  cetirizine, 5 mg, Oral, Daily  furosemide, 40 mg, Oral, Daily  guaiFENesin, 1,200 mg, Oral, Q12H  insulin lispro, 0-7 Units, Subcutaneous, TID AC  lisinopril, 20 mg, Oral, Daily  mirtazapine, 15 mg, Oral, Nightly  pantoprazole, 40 mg, Intravenous, Q12H  pravastatin, 40 mg, Oral, Nightly  sertraline, 50 mg, Oral, Daily  sodium chloride, 10 mL, Intravenous, Q12H      Continuous Infusions:nitroglycerin, 5-200 mcg/min      PRN Meds:.dextrose  •  dextrose  •  glucagon (human recombinant)  •  ipratropium-albuterol  •  melatonin  •  ondansetron  •  oxyCODONE-acetaminophen  •  sodium chloride  •  sodium chloride    Assessment/Plan   Assessment & Plan     Active Hospital Problems    Diagnosis  POA   • **Upper GI bleed [K92.2]  Yes   • Symptomatic anemia [D64.9]  Yes   • Acute diastolic heart failure (CMS/HCC) [I50.31]  Yes   • DM (diabetes mellitus), type 2 (CMS/HCC) [E11.9]  Yes   • Essential hypertension [I10]  Yes   • GERD (gastroesophageal reflux disease) [K21.9]  Yes   • Hyperlipidemia LDL goal <100 [E78.5]  Yes   • Permanent atrial fibrillation (CMS/HCC) [I48.21]  Yes   • History of CVA in adulthood [Z86.73]  Not Applicable      Resolved Hospital Problems   No resolved problems to display.        Brief Hospital Course to date:  Kandace Zhou is a 87 y.o. female with PMH of chronic respiratory failure on 4 L supplemental oxygen at baseline, hypertension, type 2 diabetes, hyperlipidemia, CHF, permanent atrial fibrillation chronically anticoagulated with Xarelto, tachybradycardia syndrome, GERD, CVA with residual right-sided weakness>  Recently admitted at [facility] for CHF, discharged 10/29, and brought to [facility] ED on 10/30 by daughter who noted weakness, unable to get OOB.   Patient noted intermittent melena.   Hemoglobin noted to be 8.1 down from baseline of 12.       Upper GI Bleed on  Xarelto  Symptomatic anemia. Hgb baseline is 12.0, now down to 8.2  - heme occult positive.  - serial hgb,   - IV PPI   - consult to GI;  - EGD unrevealing, plan colonoscopy    Acute Heart Failure:   - likely secondary to GI bleed  - history of cardiomyopathy echo 3/21/2019 EF 40 to 45%  - administered 80 mg lasix in ED and diuresed 1400ml  - resume home lasix of 40 mg daily, give extra if patient is transfused  - obtain echocardiogram  - nl TSH, magnesium  - daily weights, strict intake and output  - followed by Dr. Brand per cardiology     PAF:  - currently stable and rate controlled, Coreg.  - chadsvasc = 5  - xarelto held for GI bleed     HTN/HLD: elevated; watch after morning lisinopril      DM2:  - hold oral hypoglycemics  - start SSI with scheduled accu checks        DVT prophylaxis:  mechanical      Disposition: I expect the patient to be discharged tbd    CODE STATUS:   Code Status and Medical Interventions:   Ordered at: 10/30/20 6092     Limited Support to NOT Include:    Intubation     Level Of Support Discussed With:    Patient     Code Status:    No CPR     Medical Interventions (Level of Support Prior to Arrest):    Limited     Comments:    patient and living will       Jamaica Lyons MD  11/01/20

## 2020-11-01 NOTE — ANESTHESIA POSTPROCEDURE EVALUATION
Patient: Kandace Zhou    Procedure Summary     Date: 11/01/20 Room / Location:  [facility]    Anesthesia Start: 1113 Anesthesia Stop:     Procedure: ESOPHAGOGASTRODUODENOSCOPY (N/A ) Diagnosis:     Surgeon: Brunner, Mark I, MD Provider: Daryl Washburn MD    Anesthesia Type: general ASA Status: 4          Anesthesia Type: general    Vitals  No vitals data found for the desired time range.          Post Anesthesia Care and Evaluation    Patient location during evaluation: PACU  Patient participation: complete - patient participated  Level of consciousness: awake and alert  Pain score: 0  Pain management: adequate  Airway patency: patent  Anesthetic complications: No anesthetic complications  PONV Status: none  Cardiovascular status: hemodynamically stable and acceptable  Respiratory status: nonlabored ventilation, acceptable and nasal cannula  Hydration status: acceptable

## 2020-11-02 PROBLEM — D50.0 CHRONIC BLOOD LOSS ANEMIA: Status: ACTIVE | Noted: 2020-01-01

## 2020-11-02 NOTE — ANESTHESIA PREPROCEDURE EVALUATION
Anesthesia Evaluation     Patient summary reviewed and Nursing notes reviewed   history of anesthetic complications:  NPO Solid Status: > 8 hours  NPO Liquid Status: > 8 hours           Airway   Mallampati: I  Dental      Pulmonary    (+) pleural effusion, a smoker, shortness of breath,   Cardiovascular     ECG reviewed    (+) pacemaker interrogated 3-6 months ago, hypertension, dysrhythmias (tachy louann syndrome), hyperlipidemia,     ROS comment: ECG Ventric paced rythym     ECHO EF 51 %.RA severely dilated.LA mod dilated  Mild to moderate MVR/TVR  ·  RVSP (TR)moderately elevated (45-55 mmHg).        Neuro/Psych  (+) CVA (R Side ), weakness,     GI/Hepatic/Renal/Endo    (+)  GERD, GI bleeding , renal disease ARF, diabetes mellitus,     Musculoskeletal     Abdominal    Substance History      OB/GYN          Other   blood dyscrasia anemia,                   Anesthesia Plan    ASA 4     general     intravenous induction     Anesthetic plan, all risks, benefits, and alternatives have been provided, discussed and informed consent has been obtained with: patient.    Plan discussed with CRNA.

## 2020-11-02 NOTE — PLAN OF CARE
Goal Outcome Evaluation:  Plan of Care Reviewed With: patient  Progress: no change   VSS, 3L NC, pt c/o pain 1x, PRN percocet administered, pt has been AxOx4 this shift. Bowel prep continued through the night, pt had 2 large incontinent dark green BM. Consent for colonoscopy signed and in chart. No new concerns at this time, will continue to monitor.

## 2020-11-02 NOTE — PROGRESS NOTES
"    [facility] Medicine Services  PROGRESS NOTE    Patient Name: Kandace Zhou  : 1933  MRN: [id number]    Date of Admission: 10/30/2020  Primary Care Physician: Vanessa Trujillo MD    Subjective   Subjective     CC:  Weak and unable to get out of bed    HPI:  \"Better than I was but not so good.\"  Just took pain medicine.  Breathing is better.  No specific complaints.     Review of Systems  No fevers or chills.   No palpitations, no chest pain  Says her breathing has improved from 'before'  Denies n/v/d today    Objective   Objective     Vital Signs:   Temp:  [97.3 °F (36.3 °C)-98.7 °F (37.1 °C)] 97.8 °F (36.6 °C)  Heart Rate:  [] 75  Resp:  [14-18] 16  BP: (106-128)/(51-94) 108/76        Physical Exam:  Gen:  Elderly frail-looking woman on nc oxygen, awake, breathing looks more comfortable than yest.  No visitors present  Neuro: alert and oriented, clear speech, follows commands, grossly nonfocal other than [place]. Poor historian however  HEENT:  NC/AT PERRL, OP benign  Neck:  Supple, no LAD  Heart RRR no murmur, rub, or gallop  Lungs decreased throughout but not labored, not coughing, no W or rales.   Abd:  Soft, nontender, no rebound or guarding, pos BS  Extrem:  No c/c/e      Results Reviewed:  Results from last 7 days   Lab Units 11/01/20  2044 11/01/20  1254 10/31/20  1920  10/31/20  0538  10/30/20  1717   WBC 10*3/mm3  --  9.78  --   --  8.91  --  11.04*   HEMOGLOBIN g/dL 9.5* 9.7* 8.3*   < > 7.9*  7.9*   < > 8.2*   HEMATOCRIT % 37.7 35.7 29.5*   < > 27.6*  27.6*   < > 28.9*   PLATELETS 10*3/mm3  --  242  --   --  230  --  292    < > = values in this interval not displayed.     Results from last 7 days   Lab Units 11/01/20  1254 10/31/20  0538 10/30/20  1717   SODIUM mmol/L 137 140 142   POTASSIUM mmol/L 4.2 4.1 5.1   CHLORIDE mmol/L 96* 99 105   CO2 mmol/L 31.0* 32.0* 26.0   BUN mg/dL 31* 42* 41*   CREATININE mg/dL 1.03* 1.09* 1.04*   GLUCOSE mg/dL 143* 137* 193*   "   CALCIUM mg/dL 8.6 8.8 9.2   ALT (SGPT) U/L  --   --  71*   AST (SGOT) U/L  --   --  82*   TROPONIN T ng/mL  --   --  <0.010   PROBNP pg/mL  --   --  5,538.0*     Estimated Creatinine Clearance: 26.5 mL/min (A) (by C-G formula based on SCr of 1.03 mg/dL (H)).    Microbiology Results Abnormal     Procedure Component Value - Date/Time    COVID PRE-OP / PRE-PROCEDURE SCREENING ORDER (NO ISOLATION) - Swab, Nasal Cavity [[id number]]  (Normal) Collected: 10/31/20 0114    Lab Status: Final result Specimen: Swab from Nasal Cavity Updated: 10/31/20 0203    Narrative:      The following orders were created for panel order COVID PRE-OP / PRE-PROCEDURE SCREENING ORDER (NO ISOLATION) - Swab, Nasal Cavity.  Procedure                               Abnormality         Status                     ---------                               -----------         ------                     COVID-19, ABBOTT IN-HOUS...[[id number]]  Normal              Final result                 Please view results for these tests on the individual orders.    COVID-19, ABBOTT IN-HOUSE,NP Swab (NO TRANSPORT MEDIA) 2 HR TAT - Swab, Nasal Cavity [[id number]]  (Normal) Collected: 10/31/20 0114    Lab Status: Final result Specimen: Swab from Nasal Cavity Updated: 10/31/20 0203     COVID19 Not Detected    Narrative:      Fact sheet for providers: https://www.fda.gov/media/633687/download     Fact sheet for patients: https://www.fda.gov/media/056313/download          Imaging Results (Last 24 Hours)     ** No results found for the last 24 hours. **          Results for orders placed during the hospital encounter of 10/30/20   Transthoracic Echo Complete With Contrast if Necessary Per Protocol    Narrative · Left ventricular ejection fraction appears to be 51 - 55%.  · The right atrial cavity is severely dilated.  · There is mainly affecting the non-coronary cusp(s).  · Mild to moderate mitral valve regurgitation is present.  · Estimated right ventricular systolic  pressure from tricuspid   regurgitation is moderately elevated (45-55 mmHg).  · Moderate tricuspid valve regurgitation is present.  · The left atrial cavity is moderately dilated.          I have reviewed the medications:  Scheduled Meds:carvedilol, 12.5 mg, Oral, BID  cetirizine, 5 mg, Oral, Daily  furosemide, 40 mg, Oral, Daily  guaiFENesin, 1,200 mg, Oral, Q12H  insulin lispro, 0-7 Units, Subcutaneous, TID AC  lisinopril, 20 mg, Oral, Daily  mirtazapine, 15 mg, Oral, Nightly  pantoprazole, 40 mg, Intravenous, Q12H  pravastatin, 40 mg, Oral, Nightly  sertraline, 50 mg, Oral, Daily  sodium chloride, 10 mL, Intravenous, Q12H      Continuous Infusions:lactated ringers, 9 mL/hr  nitroglycerin, 5-200 mcg/min      PRN Meds:.•  calcium carbonate EX  •  dextrose  •  dextrose  •  glucagon (human recombinant)  •  ipratropium-albuterol  •  melatonin  •  ondansetron  •  oxyCODONE-acetaminophen  •  sodium chloride  •  sodium chloride    Assessment/Plan   Assessment & Plan     Active Hospital Problems    Diagnosis  POA   • **Upper GI bleed [K92.2]  Yes   • Symptomatic anemia [D64.9]  Yes   • Acute diastolic heart failure (CMS/HCC) [I50.31]  Yes   • DM (diabetes mellitus), type 2 (CMS/HCC) [E11.9]  Yes   • Essential hypertension [I10]  Yes   • GERD (gastroesophageal reflux disease) [K21.9]  Yes   • Hyperlipidemia LDL goal <100 [E78.5]  Yes   • Permanent atrial fibrillation (CMS/HCC) [I48.21]  Yes   • History of CVA in adulthood [Z86.73]  Not Applicable      Resolved Hospital Problems   No resolved problems to display.        Brief Hospital Course to date:  Kandace Zhou is a 87 y.o. female with PMH of chronic respiratory failure on 4 L supplemental oxygen at baseline, hypertension, type 2 diabetes, hyperlipidemia, CHF, permanent atrial fibrillation chronically anticoagulated with Xarelto, tachybradycardia syndrome, GERD, CVA with residual right-sided weakness.  Recently admitted at [facility] for CHF, discharged 10/29, and  brought to [facility] ED on 10/30 by daughter who noted weakness, ankle swelling, pt unable to get OOB.   Patient noted intermittent melena.   Hemoglobin noted to be 8.1 down from baseline of 12.       Upper GI Bleed on Xarelto  Symptomatic anemia. Hgb baseline is 12.0, now down to 8.2  - heme occult positive.but hgb has been stable to increasd since admission  - serial hgb,   - IV PPI   - consult to GI;  - EGD unrevealing, plan colonoscopy today    Acute Heart Failure:   - history of cardiomyopathy echo 3/21/2019 EF 40 to 45%, now 50-55  - administered 80 mg lasix in ED and diuresed 1400ml  - increase lasix from home dose, up to 80 mg daily  - watch magn K and cr.  Patient on aCEI  - daily weights, strict intake and output  - followed by Dr. Brand per cardiology     PAF:  - currently stable and rate controlled, Coreg.  - chadsvasc = 5  - xarelto held for GI bleed     HTN/HLD      DM2:  - hold oral hypoglycemics  - start SSI with scheduled accu checks        DVT prophylaxis:  mechanical      Disposition: I expect the patient to be discharged tbd/  PT and OT consulted.     CODE STATUS:   Code Status and Medical Interventions:   Ordered at: 10/30/20 6577     Limited Support to NOT Include:    Intubation     Level Of Support Discussed With:    Patient     Code Status:    No CPR     Medical Interventions (Level of Support Prior to Arrest):    Limited     Comments:    patient and living will       Jamaica Lyons MD  11/02/20

## 2020-11-02 NOTE — PLAN OF CARE
Pt unable to do colonoscopy today. Plan is to do bowel prep again tonight and plan for procedure ulises. Complaints of pain, medicated with PRN. Pt anxious to get procedure over with.

## 2020-11-02 NOTE — PROGRESS NOTES
Discharge Planning Assessment  [facility]     Patient Name: Kandace Zhou  MRN: [id number]  Today's Date: 11/2/2020    Admit Date: 10/30/2020    Discharge Needs Assessment     Row Name 11/02/20 1048       Living Environment    Lives With  child(ezekiel), adult    Current Living Arrangements  home/apartment/condo    Primary Care Provided by  self    Provides Primary Care For  no one    Family Caregiver if Needed  child(ezekiel), adult    Able to Return to Prior Arrangements  yes       Transition Planning    Patient/Family Anticipates Transition to  home    Transportation Anticipated  family or friend will provide       Discharge Needs Assessment    Equipment Currently Used at Home  rollator;commode;shower chair;wheelchair        Discharge Plan     Row Name 11/02/20 1049       Plan    Plan  Home w/ HH    Provided Post Acute Provider List?  Yes    Patient/Family in Agreement with Plan  yes    Plan Comments  Spoke w/ patient and daughter at bedside. Patient lives w/ her dtr in a one story in [facility]. PTA patient was independent w/ ADLs and used a rollator for mobility assist. She is on 4L NC continuous home oxygen. Her daughter is retired is available to assist 24/7 if needed. Patient is current w/ [facility] for SN. Notified Fermín w/ Enrique of patient's admission. Plan at this time is to return home w/ HH. CM following.    Final Discharge Disposition Code  06 - home with home health care        Continued Care and Services - Admitted Since 10/30/2020     Home Medical Care     Service Provider Request Status Selected Services Address Phone Fax    [facility]  Pending - No Request Sent N/A [address] [address] [phone]                Demographic Summary     Row Name 11/02/20 1042       General Information    Arrived From  home    Reason for Consult  discharge planning        Functional Status     Row Name 11/02/20 1042       Functional Status    Usual Activity  Tolerance  moderate    Current Activity Tolerance  moderate        Psychosocial    No documentation.       Abuse/Neglect    No documentation.       Legal    No documentation.       Substance Abuse    No documentation.       Patient Forms    No documentation.           Veronica Franco RN

## 2020-11-03 NOTE — ANESTHESIA PREPROCEDURE EVALUATION
Anesthesia Evaluation     Patient summary reviewed and Nursing notes reviewed   NPO Solid Status: > 8 hours  NPO Liquid Status: > 8 hours           Airway   Mallampati: I  TM distance: >3 FB  Neck ROM: full  No difficulty expected  Dental    (+) edentulous    Pulmonary    Cardiovascular     ECG reviewed      ROS comment: ECG Ventric paced   ECHO EF 51 %.RA severely dilated.LA mod dilated  Mild to moderate MVR/TVR  ·  RVSP (TR)moderately elevated (45-55 mmHg).   PM checked adequate battery life     Neuro/Psych  (+) weakness,     GI/Hepatic/Renal/Endo    Renal disease: creat <1  Diabetes: .    Musculoskeletal     Abdominal    Substance History      OB/GYN          Other        ROS/Med Hx Other: See note from 24 hours ago   HCT35 creat <1    Severe hearing loss makes Hx diifficult                  Airway   Mallampati: I  Dental      Pulmonary    (+) pleural effusion, a smoker, shortness of breath,   Cardiovascular     ECG reviewed    (+) pacemaker interrogated 3-6 months ago, hypertension, dysrhythmias (tachy louann syndrome), hyperlipidemia,     ROS comment: ECG Ventric paced rythym     ECHO EF 51 %.RA severely dilated.LA mod dilated  Mild to moderate MVR/TVR  ·  RVSP (TR)moderately elevated (45-55 mmHg).        Neuro/Psych  (+) CVA (R Side ), weakness,     GI/Hepatic/Renal/Endo    (+)  GERD, GI bleeding , renal disease ARF, diabetes mellitus,     Musculoskeletal     Abdominal    Substance History      OB/GYN          Other   blood dyscrasia anemia,                   Anesthesia Plan    ASA 4     general     intravenous induction     Anesthetic plan, all risks, benefits, and alternatives have been provided, discussed and informed consent has been obtained with: patient.    Plan discussed with CRNA.             Anesthesia Plan    ASA 3     general and MAC   (PFL)  intravenous induction     Anesthetic plan, all risks, benefits, and alternatives have been provided, discussed and informed consent has been  obtained with: patient.    Plan discussed with CRNA.

## 2020-11-03 NOTE — PROGRESS NOTES
[facility] Medicine Services  PROGRESS NOTE    Patient Name: Kandace Zhou  : 1933  MRN: [id number]    Date of Admission: 10/30/2020  Primary Care Physician: Vanessa Trujillo MD    Subjective   Subjective     CC:  Weak and unable to get out of bed    HPI: Patient states that she wants to go home.  She does want a colonoscopy.  Not tolerating the prep.  Not clear enough for colonoscopy as of yet.    Review of Systems  General: denies fevers or chills  CV: denies chest pain  Resp: denies shortness of breath  Abd: denies abd pain, nausea      Objective   Objective     Vital Signs:   Temp:  [97.6 °F (36.4 °C)-98.8 °F (37.1 °C)] 97.8 °F (36.6 °C)  Heart Rate:  [75-84] 80  Resp:  [16-20] 18  BP: ()/(65-89) 117/70        Physical Exam:  Gen: Elderly female, no acute distress but appears frustrated regarding bowel prep, thin and frail  HENT: NCAT, mucous membranes moist  Respiratory: Clear to auscultation bilaterally, respiratory effort normal   Cardiovascular: RRR, no murmurs, rubs, or gallops, palpable pedal pulses bilaterally  Gastrointestinal: Positive bowel sounds, soft, fused tenderness with palpation, nondistended  Musculoskeletal: No bilateral ankle edema  Psychiatric: Tearful affect, cooperative  Neurologic: no focal neurological deficits, alert and oriented        Results Reviewed:  Results from last 7 days   Lab Units 20  1201 11/01/20  2044 11/01/20  1254  10/31/20  0538   WBC 10*3/mm3 13.48*  --  9.78  --  8.91   HEMOGLOBIN g/dL 9.9* 9.5* 9.7*   < > 7.9*  7.9*   HEMATOCRIT % 35.2 37.7 35.7   < > 27.6*  27.6*   PLATELETS 10*3/mm3 202  --  242  --  230    < > = values in this interval not displayed.     Results from last 7 days   Lab Units 20  1201 20  1254 10/31/20  0538 10/30/20  1717   SODIUM mmol/L 138 137 140 142   POTASSIUM mmol/L 4.1 4.2 4.1 5.1   CHLORIDE mmol/L 100 96* 99 105   CO2 mmol/L 22.0 31.0* 32.0* 26.0   BUN mg/dL 22 31* 42* 41*      CREATININE mg/dL 0.93 1.03* 1.09* 1.04*   GLUCOSE mg/dL 137* 143* 137* 193*   CALCIUM mg/dL 8.7 8.6 8.8 9.2   ALT (SGPT) U/L  --   --   --  71*   AST (SGOT) U/L  --   --   --  82*   TROPONIN T ng/mL  --   --   --  <0.010   PROBNP pg/mL  --   --   --  5,538.0*     Estimated Creatinine Clearance: 29.5 mL/min (by C-G formula based on SCr of 0.93 mg/dL).    Microbiology Results Abnormal     Procedure Component Value - Date/Time    COVID PRE-OP / PRE-PROCEDURE SCREENING ORDER (NO ISOLATION) - Swab, Nasal Cavity [[id number]]  (Normal) Collected: 10/31/20 0114    Lab Status: Final result Specimen: Swab from Nasal Cavity Updated: 10/31/20 0203    Narrative:      The following orders were created for panel order COVID PRE-OP / PRE-PROCEDURE SCREENING ORDER (NO ISOLATION) - Swab, Nasal Cavity.  Procedure                               Abnormality         Status                     ---------                               -----------         ------                     COVID-19, ABBOTT IN-HOUS...[[id number]]  Normal              Final result                 Please view results for these tests on the individual orders.    COVID-19, ABBOTT IN-HOUSE,NP Swab (NO TRANSPORT MEDIA) 2 HR TAT - Swab, Nasal Cavity [[id number]]  (Normal) Collected: 10/31/20 0114    Lab Status: Final result Specimen: Swab from Nasal Cavity Updated: 10/31/20 0203     COVID19 Not Detected    Narrative:      Fact sheet for providers: https://www.fda.gov/media/901075/download     Fact sheet for patients: https://www.fda.gov/media/783853/download          Imaging Results (Last 24 Hours)     ** No results found for the last 24 hours. **          Results for orders placed during the hospital encounter of 10/30/20   Transthoracic Echo Complete With Contrast if Necessary Per Protocol    Narrative · Left ventricular ejection fraction appears to be 51 - 55%.  · The right atrial cavity is severely dilated.  · There is mainly affecting the non-coronary cusp(s).  · Mild to  moderate mitral valve regurgitation is present.  · Estimated right ventricular systolic pressure from tricuspid   regurgitation is moderately elevated (45-55 mmHg).  · Moderate tricuspid valve regurgitation is present.  · The left atrial cavity is moderately dilated.          I have reviewed the medications:  Scheduled Meds:carvedilol, 12.5 mg, Oral, BID  cetirizine, 5 mg, Oral, Daily  furosemide, 80 mg, Oral, Daily  guaiFENesin, 1,200 mg, Oral, Q12H  insulin lispro, 0-7 Units, Subcutaneous, TID AC  lisinopril, 20 mg, Oral, Daily  mirtazapine, 15 mg, Oral, Nightly  pantoprazole, 40 mg, Intravenous, Q12H  PEG-KCl-NaCl-NaSulf-Na Asc-C, 1,000 mL, Oral, Once  pravastatin, 40 mg, Oral, Nightly  sertraline, 50 mg, Oral, Daily  sodium chloride, 10 mL, Intravenous, Q12H      Continuous Infusions:lactated ringers, 9 mL/hr  nitroglycerin, 5-200 mcg/min      PRN Meds:.•  calcium carbonate EX  •  dextrose  •  dextrose  •  glucagon (human recombinant)  •  ipratropium-albuterol  •  melatonin  •  ondansetron  •  oxyCODONE-acetaminophen  •  sodium chloride  •  sodium chloride    Assessment/Plan   Assessment & Plan     Active Hospital Problems    Diagnosis  POA   • **Upper GI bleed [K92.2]  Yes   • Symptomatic anemia [D64.9]  Yes   • Acute diastolic heart failure (CMS/HCC) [I50.31]  Yes   • Chronic blood loss anemia [D50.0]  Unknown   • DM (diabetes mellitus), type 2 (CMS/HCC) [E11.9]  Yes   • Essential hypertension [I10]  Yes   • GERD (gastroesophageal reflux disease) [K21.9]  Yes   • Hyperlipidemia LDL goal <100 [E78.5]  Yes   • Permanent atrial fibrillation (CMS/HCC) [I48.21]  Yes   • History of CVA in adulthood [Z86.73]  Not Applicable      Resolved Hospital Problems   No resolved problems to display.        Brief Hospital Course to date:  Kandace Zhou is a 87 y.o. female with PMH of chronic respiratory failure on 4 L supplemental oxygen at baseline, hypertension, type 2 diabetes, hyperlipidemia, CHF, permanent atrial  fibrillation chronically anticoagulated with Xarelto, tachybradycardia syndrome, GERD, CVA with residual right-sided weakness.  Recently admitted at [facility] for CHF, discharged 10/29, and brought to [facility] ED on 10/30 by daughter who noted weakness, ankle swelling, pt unable to get OOB.   Patient noted intermittent melena.   Hemoglobin noted to be 8.1 down from baseline of 12.    Upper GI Bleed on Xarelto  Symptomatic anemia. Hgb baseline is 12.0, now down to 8.2  - heme occult positive.but hgb has been stable to increasd since admission  -Hemoglobins have been stable.  - IV PPI   -GI following  - EGD unrevealing, colonoscopy was planned for today but she is having trouble with the prep.  States that she does not want a colonoscopy.  Will need to: Discussed with daughter.    Acute Heart Failure:   - history of cardiomyopathy echo 3/21/2019 EF 40 to 45%, now 50-55  - administered 80 mg lasix in ED and diuresed 1400ml  - increase lasix from home dose  - watch magn K and cr.  Patient on aCEI  - daily weights, strict intake and output  - followed by Dr. Brand per cardiology     PAF:  - currently stable and rate controlled, Coreg.  - chadsvasc = 5  - xarelto held for GI bleed     HTN/HLD      DM2:  - hold oral hypoglycemics  - start SSI with scheduled accu checks        DVT prophylaxis:  mechanical      Disposition: I expect the patient to be discharged tbd/  PT and OT consulted.     CODE STATUS:   Code Status and Medical Interventions:   Ordered at: 10/30/20 4937     Limited Support to NOT Include:    Intubation     Level Of Support Discussed With:    Patient     Code Status:    No CPR     Medical Interventions (Level of Support Prior to Arrest):    Limited     Comments:    patient and living will       This patient's problems and plans were partially entered by my partner and updated as appropriate by me 11/03/20. Today is my first day evaluating this patients active medical problems. I Personally reviewed chart and  adjusted note to reflect daily changes in management/clinical condition      Cori Sarah MD  11/03/20

## 2020-11-03 NOTE — BRIEF OP NOTE
COLONOSCOPY  Progress Note    Kandace Zhou  11/3/2020    Colonoscopy reveals formed stool in the rectum.  This was removed by manual disimpaction.  Colonoscopy was then performed which revealed an adequate prep.  Sigmoid diverticulosis noted.  There are a few small stercoral ulcers in the rectum, without bleeding.    >> Needs bowel regimen  >> Future screening colonoscopy is not necessary    Will sign off.  Please call with questions or concerns.    Mark I. Brunner, MD     Date: 11/3/2020  Time: 17:44 EST

## 2020-11-03 NOTE — PLAN OF CARE
Goal Outcome Evaluation:  Plan of Care Reviewed With: patient  Progress: no change   VSS. Pt  on 3L NC. Pt continues moviprep. BM watery but still black. No other complaints at present. Will continue to monitor.

## 2020-11-03 NOTE — ANESTHESIA POSTPROCEDURE EVALUATION
Patient: Kandace Zhou    Procedure Summary     Date: 11/03/20 Room / Location:  [facility] /  [facility]    Anesthesia Start: 1626 Anesthesia Stop: 1744    Procedure: COLONOSCOPY (N/A ) Diagnosis:       Chronic blood loss anemia      (Chronic blood loss anemia [D50.0])    Surgeon: Brunner, Mark I, MD Provider: Zunilda Smith DO    Anesthesia Type: general, MAC ASA Status: 3          Anesthesia Type: general, MAC    Vitals  Vitals Value Taken Time   /99 11/03/20 1730   Temp 99 °F (37.2 °C) 11/03/20 1715   Pulse 77 11/03/20 1737   Resp 21 11/03/20 1715   SpO2 83 % 11/03/20 1737   Vitals shown include unvalidated device data.        Post Anesthesia Care and Evaluation    Patient location during evaluation: PACU  Patient participation: complete - patient participated  Level of consciousness: awake and alert  Pain score: 0  Pain management: adequate  Airway patency: patent  Anesthetic complications: No anesthetic complications  PONV Status: none  Cardiovascular status: hemodynamically stable and acceptable  Respiratory status: nonlabored ventilation, acceptable and nasal cannula  Hydration status: acceptable

## 2020-11-04 NOTE — PLAN OF CARE
Problem: Adult Inpatient Plan of Care  Goal: Plan of Care Review  Outcome: Ongoing, Progressing  Goal: Patient-Specific Goal (Individualized)  Outcome: Ongoing, Progressing  Goal: Absence of Hospital-Acquired Illness or Injury  Outcome: Ongoing, Progressing  Intervention: Identify and Manage Fall Risk  Recent Flowsheet Documentation  Taken 11/4/2020 1600 by Keila Cobb RN  Safety Promotion/Fall Prevention:   activity supervised   assistive device/personal items within reach   clutter free environment maintained   nonskid shoes/slippers when out of bed   room organization consistent   safety round/check completed  Taken 11/4/2020 1400 by Keila Cobb RN  Safety Promotion/Fall Prevention:   activity supervised   assistive device/personal items within reach   clutter free environment maintained   fall prevention program maintained   room organization consistent   safety round/check completed   nonskid shoes/slippers when out of bed  Taken 11/4/2020 1200 by Keila Cobb, RN  Safety Promotion/Fall Prevention:   activity supervised   assistive device/personal items within reach   clutter free environment maintained   fall prevention program maintained   nonskid shoes/slippers when out of bed   room organization consistent   safety round/check completed  Taken 11/4/2020 1000 by Keila Cobb RN  Safety Promotion/Fall Prevention:   activity supervised   assistive device/personal items within reach   clutter free environment maintained   fall prevention program maintained   muscle strengthening facilitated   nonskid shoes/slippers when out of bed   safety round/check completed  Taken 11/4/2020 0800 by Keila Cobb, RN  Safety Promotion/Fall Prevention:   activity supervised   assistive device/personal items within reach   clutter free environment maintained   fall prevention program maintained   gait belt   nonskid shoes/slippers when out of bed   room organization consistent   safety round/check  completed  Intervention: Prevent Skin Injury  Recent Flowsheet Documentation  Taken 11/4/2020 1600 by Keila Cobb RN  Body Position:   neutral head position   neutral body alignment  Taken 11/4/2020 1400 by Keila Cobb RN  Body Position:   neutral body alignment   neutral head position   turned  Taken 11/4/2020 1200 by Keila Cobb RN  Body Position: tilted, right  Taken 11/4/2020 1000 by Keila Cobb RN  Body Position:   neutral head position   neutral body alignment  Taken 11/4/2020 0800 by Keila Cobb RN  Body Position:   neutral head position   neutral body alignment  Intervention: Prevent Infection  Recent Flowsheet Documentation  Taken 11/4/2020 1600 by Keila Cobb RN  Infection Prevention:   environmental surveillance performed   rest/sleep promoted  Taken 11/4/2020 1400 by Keila Cobb RN  Infection Prevention:   environmental surveillance performed   rest/sleep promoted  Taken 11/4/2020 1200 by Keila Cobb RN  Infection Prevention:   environmental surveillance performed   rest/sleep promoted  Taken 11/4/2020 1000 by Keila Cobb RN  Infection Prevention:   environmental surveillance performed   rest/sleep promoted  Taken 11/4/2020 0800 by Keila Cobb RN  Infection Prevention:   environmental surveillance performed   rest/sleep promoted  Goal: Optimal Comfort and Wellbeing  Outcome: Ongoing, Progressing  Intervention: Provide Person-Centered Care  Recent Flowsheet Documentation  Taken 11/4/2020 1600 by Keila Cobb RN  Trust Relationship/Rapport:   care explained   choices provided   emotional support provided   empathic listening provided   questions answered   questions encouraged   reassurance provided   thoughts/feelings acknowledged  Taken 11/4/2020 1200 by Keila Cobb RN  Trust Relationship/Rapport:   care explained   choices provided   empathic listening provided   emotional support provided   questions answered   questions encouraged   reassurance provided    thoughts/feelings acknowledged  Taken 11/4/2020 0800 by Keila Cobb RN  Trust Relationship/Rapport:   care explained   choices provided   emotional support provided   empathic listening provided   questions answered   questions encouraged   reassurance provided   thoughts/feelings acknowledged  Goal: Readiness for Transition of Care  Outcome: Ongoing, Progressing   Goal Outcome Evaluation:  Plan of Care Reviewed With: patient  Progress: no change

## 2020-11-04 NOTE — PLAN OF CARE
"  Problem: Adult Inpatient Plan of Care  Goal: Plan of Care Review  Recent Flowsheet Documentation  Taken 11/4/2020 0903 by Norma Butt PT  Plan of Care Reviewed With:   patient   daughter  Outcome Summary: PT evaluation completed on this date.  Despite maximum encouragement and education, pt initially refused all mobility.  Multiple attempts made at convincing pt to work with therapy, but pt continued to state, \"I will do it when I want to and you all are forcing me.\"  Once daughter arrived, daughter able to convince pt to transfer to chair.  Once immediately in chair, pt adament about returning to bed.  Therefore, pt completed 2 transfers with MinAx1 using HHA.  Pt transferred supine<-->sit with MinAx1, and stood with CGAx1.  Will begin PT frequency at daily - however, if pt continues to be non-participatory, will decrease to 3x/wk.  Skilled PT services warranted to improve mobility and safety.  Recommend d/c home with 24/7 and  PT.     "

## 2020-11-04 NOTE — PLAN OF CARE
Problem: Adult Inpatient Plan of Care  Goal: Plan of Care Review  Recent Flowsheet Documentation  Taken 11/4/2020 0905 by Vidya Nunez OT  Plan of Care Reviewed With: patient  Outcome Summary: OT amarilis complete.  Pt requiring max encouragement to participate.   Pt presents with confusion, weakness and decreased activity tolerance limiting independence with ADLs.   Pt required min A supine to sit,  CGA STS Min A with HHA on L to transfer bed to chair and chair to bed.  Pt's expected UB self care/ self feeding min A,  LB self care max A/total A. OT will attempt to follow to advance pt toward PLOF with ADLs.  Recommend home with 24/7 supervision and HHOT.

## 2020-11-04 NOTE — THERAPY EVALUATION
Patient Name: Kandace Zhou  : 1933    MRN: [id number]                              Today's Date: 2020       Admit Date: 10/30/2020    Visit Dx:     ICD-10-CM ICD-9-CM   1. Upper GI bleed  K92.2 578.9   2. Anticoagulated  Z79.01 V58.61   3. Acute on chronic congestive heart failure, unspecified heart failure type (CMS/HCC)  I50.9 428.0   4. Chronic respiratory failure with hypoxia and hypercapnia (CMS/HCC)  J96.11 518.83    J96.12 799.02     786.09   5. History of diabetes mellitus  Z86.39 V12.29   6. Chronic blood loss anemia  D50.0 280.0     Patient Active Problem List   Diagnosis   • Permanent atrial fibrillation (CMS/HCC)   • Tachy-louann syndrome (CMS/HCC)   • Cardiomyopathy (CMS/HCC)   • Essential hypertension   • Hyperlipidemia LDL goal <100   • History of CVA in adulthood   • DM (diabetes mellitus), type 2 (CMS/HCC)   • GERD (gastroesophageal reflux disease)   • SOFI (acute kidney injury) (CMS/HCC)   • Poor historian   • Upper GI bleed   • Symptomatic anemia   • Acute diastolic heart failure (CMS/HCC)   • Chronic blood loss anemia     Past Medical History:   Diagnosis Date   • Acid reflux    • Arrhythmia    • Atrial fibrillation (CMS/HCC)    • Diabetes mellitus (CMS/HCC)    • Hyperlipidemia    • Hypertension    • Incontinence    • Pleural effusion    • PONV (postoperative nausea and vomiting)    • SOB (shortness of breath)    • Stroke (CMS/HCC)     RIGHT SIDED WEAKNESS   • Weakness      Past Surgical History:   Procedure Laterality Date   • CARDIAC ELECTROPHYSIOLOGY PROCEDURE N/A 3/21/2019    Procedure: AV node ablation;  Surgeon: Stanley Minaya MD;  Location: [facility];  Service: Cardiovascular   • CARDIAC ELECTROPHYSIOLOGY PROCEDURE N/A 3/21/2019    Procedure: PACEMAKER IMPLANTATION-SC;  Surgeon: Stanley Minaya MD;  Location: [facility];  Service: Cardiovascular   • CARDIAC ELECTROPHYSIOLOGY PROCEDURE N/A 3/21/2019    Procedure: EP/Ablation AV  Node;  Surgeon: Stanley Minaya MD;  Location: [facility];  Service: Cardiovascular   • ENDOSCOPY N/A 11/1/2020    Procedure: ESOPHAGOGASTRODUODENOSCOPY;  Surgeon: Brunner, Mark I, MD;  Location:  [facility];  Service: Gastroenterology;  Laterality: N/A;   • HYSTERECTOMY     • HYSTERECTOMY     • OTHER SURGICAL HISTORY      LEFT ARM PLATE INSTALLED.     General Information     Row Name 11/04/20 0903          Physical Therapy Time and Intention    Document Type  evaluation  -LM     Mode of Treatment  physical therapy  -LM     Row Name 11/04/20 0903          General Information    Patient Profile Reviewed  yes  -LM     Prior Level of Function  independent:;all household mobility;gait;ADL's  -LM     Existing Precautions/Restrictions  fall;oxygen therapy device and L/min  -LM     Barriers to Rehab  uncooperative  -LM     Row Name 11/04/20 0903          Living Environment    Lives With  child(ezekiel), adult  -LM     Row Name 11/04/20 0903          Home Main Entrance    Number of Stairs, Main Entrance  none Entry Ramp  -LM     Row Name 11/04/20 0903          Stairs Within Home, Primary    Number of Stairs, Within Home, Primary  none  -LM     Row Name 11/04/20 0903          Cognition    Orientation Status (Cognition)  oriented x 3  -LM     Row Name 11/04/20 0903          Safety Issues, Functional Mobility    Safety Issues Affecting Function (Mobility)  insight into deficits/self-awareness;judgment;safety precaution awareness;safety precautions follow-through/compliance  -LM     Impairments Affecting Function (Mobility)  balance;endurance/activity tolerance;shortness of breath;strength  -LM       User Key  (r) = Recorded By, (t) = Taken By, (c) = Cosigned By    Initials Name Provider Type    LM Norma Butt PT Physical Therapist        Mobility     Row Name 11/04/20 0903          Bed Mobility    Bed Mobility  supine-sit;sit-supine  -LM     Supine-Sit [place] (Bed Mobility)  minimum assist (75%  patient effort);1 person assist;verbal cues  -LM     Sit-Supine [place] (Bed Mobility)  minimum assist (75% patient effort);1 person assist;verbal cues  -LM     Assistive Device (Bed Mobility)  bed rails;head of bed elevated  -LM     Row Name 11/04/20 0903          Transfers    Comment (Transfers)  Pt initially refused all activity despite maximum encouragement.  Educated pt on importance of mobility and explained we have been trying to work with her for 5 days.  Pt did not agree to transfer to chair until daughter arrived and convinced the pt to try.  Once to chair, pt immediately wanted to return to bed.  -LM     Row Name 11/04/20 0903          Bed-Chair Transfer    Bed-Chair [place] (Transfers)  minimum assist (75% patient effort);1 person assist;verbal cues  -LM     Assistive Device (Bed-Chair Transfers)  other (see comments) HHAx1  -LM     Row Name 11/04/20 0903          Sit-Stand Transfer    Sit-Stand [place] (Transfers)  contact guard;1 person assist;verbal cues  -LM     Assistive Device (Sit-Stand Transfers)  other (see comments) HHAx1  -LM     Row Name 11/04/20 0903          Gait/Stairs (Locomotion)    [place] Level (Gait)  unable to assess  -LM     Comment (Gait/Stairs)  Pt declined.  -LM       User Key  (r) = Recorded By, (t) = Taken By, (c) = Cosigned By    Initials Name Provider Type    Norma Roblero, PT Physical Therapist        Obj/Interventions     Row Name 11/04/20 0903          Range of Motion Comprehensive    Comment, General Range of Motion  BLEs - Decreased active hip flex; Knee flex/ext AROM WFL; Pt would not allow PT to assess ankle ROM as she continuously stated she didn't want to do this and she was being forced.  -LM     Row Name 11/04/20 0903          Strength Comprehensive (MMT)    Comment, General Manual Muscle Testing (MMT) Assessment  Unable to complete MMT as pt would not allow it  -LM     Row Name 11/04/20 0903          Balance    Balance Assessment  sitting  static balance;standing static balance;standing dynamic balance  -LM     Static Sitting Balance  WFL;sitting, edge of bed  -LM     Static Standing Balance  mild impairment;supported  -LM     Dynamic Standing Balance  mild impairment;supported  -LM       User Key  (r) = Recorded By, (t) = Taken By, (c) = Cosigned By    Initials Name Provider Type    LM Norma Butt, PT Physical Therapist        Goals/Plan     Row Name 11/04/20 0903          Bed Mobility Goal 1 (PT)    Activity/Assistive Device (Bed Mobility Goal 1, PT)  sit to supine/supine to sit  -LM     [place] Level/Cues Needed (Bed Mobility Goal 1, PT)  independent  -LM     Time Frame (Bed Mobility Goal 1, PT)  long term goal (LTG);2 weeks  -LM     [facility] Name 11/04/20 0903          Transfer Goal 1 (PT)    Activity/Assistive Device (Transfer Goal 1, PT)  bed-to-chair/chair-to-bed  -LM     [place] Level/Cues Needed (Transfer Goal 1, PT)  modified independence  -LM     Time Frame (Transfer Goal 1, PT)  long term goal (LTG);2 weeks  -LM     Row Name 11/04/20 0903          Gait Training Goal 1 (PT)    Activity/Assistive Device (Gait Training Goal 1, PT)  gait (walking locomotion);assistive device use  -LM     [place] Level (Gait Training Goal 1, PT)  modified independence  -LM     Distance (Gait Training Goal 1, PT)  150 feet  -LM     Time Frame (Gait Training Goal 1, PT)  long term goal (LTG);2 weeks  -LM       User Key  (r) = Recorded By, (t) = Taken By, (c) = Cosigned By    Initials Name Provider Type    Norma Roblero PT Physical Therapist        Clinical Impression     Row Name 11/04/20 0903          Pain    Additional Documentation  Pain Scale: Numbers Pre/Post-Treatment (Group)  -LM     Row Name 11/04/20 0903          Pain Scale: Numbers Pre/Post-Treatment    Pretreatment Pain Rating  5/10  -LM     Posttreatment Pain Rating  5/10  -LM     Pain Location - Side  Bilateral  -LM     Pain Location - Orientation  upper  -LM     Pain Location   "extremity  -LM     Pain Intervention(s)  Repositioned;Ambulation/increased activity  -LM     Row Name 11/04/20 0903          Plan of Care Review    Plan of Care Reviewed With  patient;daughter  -     Outcome Summary  PT evaluation completed on this date.  Despite maximum encouragement and education, pt initially refused all mobility.  Multiple attempts made at convincing pt to work with therapy, but pt continued to state, \"I will do it when I want to and you all are forcing me.\"  Once daughter arrived, daughter able to convince pt to transfer to chair.  Once immediately in chair, pt adament about returning to bed.  Therefore, pt completed 2 transfers with MinAx1 using HHA.  Pt transferred supine<-->sit with MinAx1, and stood with CGAx1.  Will begin PT frequency at daily - however, if pt continues to be non-participatory, will decrease to 3x/wk.  Skilled PT services warranted to improve mobility and safety.  Recommend d/c home with 24/7 and  PT.  -     Row Name 11/04/20 0903          Therapy Assessment/Plan (PT)    Criteria for Skilled Interventions Met (PT)  yes;skilled treatment is necessary  -     Row Name 11/04/20 0903          Vital Signs    Pre Systolic BP Rehab  151  -LM     Pre Treatment Diastolic BP  85  -LM     Pretreatment Heart Rate (beats/min)  82  -LM     Posttreatment Heart Rate (beats/min)  92  -LM     Pre SpO2 (%)  98  -LM     O2 Delivery Pre Treatment  supplemental O2  -LM     Post SpO2 (%)  97  -LM     O2 Delivery Post Treatment  supplemental O2  -LM     Pre Patient Position  Supine  -LM     Post Patient Position  Supine  -LM     Row Name 11/04/20 0903          Positioning and Restraints    Pre-Treatment Position  in bed  -LM     Post Treatment Position  bed  -LM     In Bed  fowlers;call light within reach;encouraged to call for assist;with family/caregiver;notified nsg  -LM       User Key  (r) = Recorded By, (t) = Taken By, (c) = Cosigned By    Initials Name Provider Type    ADELSO Butt" Norma, VJ Physical Therapist        Outcome Measures     Row Name 11/04/20 0903          How much help from another person do you currently need...    Turning from your back to your side while in flat bed without using bedrails?  3  -LM     Moving from lying on back to sitting on the side of a flat bed without bedrails?  3  -LM     Moving to and from a bed to a chair (including a wheelchair)?  3  -LM     Standing up from a chair using your arms (e.g., wheelchair, bedside chair)?  3  -LM     Climbing 3-5 steps with a railing?  2  -LM     To walk in hospital room?  3  -LM     AM-PAC 6 Clicks Score (PT)  17  -LM     Row Name 11/04/20 0903          Functional Assessment    Outcome Measure Options  AM-PAC 6 Clicks Basic Mobility (PT)  -       User Key  (r) = Recorded By, (t) = Taken By, (c) = Cosigned By    Initials Name Provider Type    LM Norma Butt, PT Physical Therapist        Physical Therapy Education                 Title: PT OT SLP Therapies (In Progress)     Topic: Physical Therapy (In Progress)     Point: Mobility training (In Progress)     Learning Progress Summary           Patient Acceptance, E, NR by  at 11/4/2020 0942                   Point: Home exercise program (Not Started)     Learner Progress:  Not documented in this visit.          Point: Precautions (In Progress)     Learning Progress Summary           Patient Acceptance, E, NR by  at 11/4/2020 0942                               User Key     Initials Effective Dates Name Provider Type Discipline     07/24/19 -  Norma Butt, PT Physical Therapist PT              PT Recommendation and Plan  Planned Therapy Interventions (PT): balance training, bed mobility training, gait training, home exercise program, motor coordination training, neuromuscular re-education, patient/family education, postural re-education, ROM (range of motion), strengthening, stretching, transfer training  Plan of Care Reviewed With: patient, daughter  Outcome Summary:  "PT evaluation completed on this date.  Despite maximum encouragement and education, pt initially refused all mobility.  Multiple attempts made at convincing pt to work with therapy, but pt continued to state, \"I will do it when I want to and you all are forcing me.\"  Once daughter arrived, daughter able to convince pt to transfer to chair.  Once immediately in chair, pt adament about returning to bed.  Therefore, pt completed 2 transfers with MinAx1 using HHA.  Pt transferred supine<-->sit with MinAx1, and stood with CGAx1.  Will begin PT frequency at daily - however, if pt continues to be non-participatory, will decrease to 3x/wk.  Skilled PT services warranted to improve mobility and safety.  Recommend d/c home with 24/7 and  PT.     Time Calculation:   PT Charges     Row Name 11/04/20 0903             Time Calculation    Start Time  0903  -LM      PT Received On  11/04/20  -      PT Goal Re-Cert Due Date  11/14/20  -        User Key  (r) = Recorded By, (t) = Taken By, (c) = Cosigned By    Initials Name Provider Type     Norma Butt, PT Physical Therapist        Therapy Charges for Today     Code Description Service Date Service Provider Modifiers Qty    [id number]  PT EVAL MOD COMPLEXITY 4 11/4/2020 Norma Butt, PT GP 1          PT G-Codes  Outcome Measure Options: AM-PAC 6 Clicks Basic Mobility (PT)  AM-PAC 6 Clicks Score (PT): 17    Norma Butt PT  11/4/2020    "

## 2020-11-04 NOTE — NURSING NOTE
Patient to be discharged home today with daughter. Pleasant and cooperative, verbalizes understanding of POC as does daughter. VSS. SBP in the 90s at times but pt had been sleeping and is asymptomatic. Denies pain. Tele had shown a VPaced rhythm. Blood sugars 108-160. Adequate I/O although needs encouragement with PO intake. Continues on home oxygen at 4 L NC/humidified. Plan to follow up with PCP in 1 week. Daughter reports that she has many family members to assist with the patient's care at home. Spoke with daughter, Marleny Murillo, on phone and while she was here visiting with pt. As did Dr Sarah--all in agreement of d/c, follow-up care.

## 2020-11-04 NOTE — PROGRESS NOTES
Continued Stay Note   [place]     Patient Name: Kandace Zhou  MRN: [id number]  Today's Date: 11/4/2020    Admit Date: 10/30/2020    Discharge Plan     Row Name 11/04/20 1139       Plan    Plan  Home w/ HH    Patient/Family in Agreement with Plan  yes    Plan Comments  Spoke w/ patient and daughter at bedside. Plan remains to return home w/ assist from daughter and to resume HH for SN. Per MD in am rounds, SLP to eval. Therapy is also recommending PT at dc. All additional HH needs indicated at dc will be included in the resumption of care orders. CM will arrange w/ [facility]. CM following.    Final Discharge Disposition Code  06 - home with home health care        Discharge Codes    No documentation.             Veronica Franco RN

## 2020-11-04 NOTE — THERAPY EVALUATION
Patient Name: Kandace Zhou  : 1933    MRN: [id number]                              Today's Date: 2020       Admit Date: 10/30/2020    Visit Dx:     ICD-10-CM ICD-9-CM   1. Upper GI bleed  K92.2 578.9   2. Anticoagulated  Z79.01 V58.61   3. Acute on chronic congestive heart failure, unspecified heart failure type (CMS/HCC)  I50.9 428.0   4. Chronic respiratory failure with hypoxia and hypercapnia (CMS/HCC)  J96.11 518.83    J96.12 799.02     786.09   5. History of diabetes mellitus  Z86.39 V12.29   6. Chronic blood loss anemia  D50.0 280.0     Patient Active Problem List   Diagnosis   • Permanent atrial fibrillation (CMS/HCC)   • Tachy-louann syndrome (CMS/HCC)   • Cardiomyopathy (CMS/HCC)   • Essential hypertension   • Hyperlipidemia LDL goal <100   • History of CVA in adulthood   • DM (diabetes mellitus), type 2 (CMS/HCC)   • GERD (gastroesophageal reflux disease)   • SOFI (acute kidney injury) (CMS/HCC)   • Poor historian   • Upper GI bleed   • Symptomatic anemia   • Acute diastolic heart failure (CMS/HCC)   • Chronic blood loss anemia     Past Medical History:   Diagnosis Date   • Acid reflux    • Arrhythmia    • Atrial fibrillation (CMS/HCC)    • Diabetes mellitus (CMS/HCC)    • Hyperlipidemia    • Hypertension    • Incontinence    • Pleural effusion    • PONV (postoperative nausea and vomiting)    • SOB (shortness of breath)    • Stroke (CMS/HCC)     RIGHT SIDED WEAKNESS   • Weakness      Past Surgical History:   Procedure Laterality Date   • CARDIAC ELECTROPHYSIOLOGY PROCEDURE N/A 3/21/2019    Procedure: AV node ablation;  Surgeon: Stanley Minaya MD;  Location: [facility] LOCATION;  Service: Cardiovascular   • CARDIAC ELECTROPHYSIOLOGY PROCEDURE N/A 3/21/2019    Procedure: PACEMAKER IMPLANTATION-SC;  Surgeon: Stanley Minaya MD;  Location: [facility] LOCATION;  Service: Cardiovascular   • CARDIAC ELECTROPHYSIOLOGY PROCEDURE N/A 3/21/2019    Procedure: EP/Ablation AV  Node;  Surgeon: Stanley Minaya MD;  Location: [facility] INV[facility] LOC[facility];  Service: Cardiovascular   • ENDOSCOPY N/A 11/1/2020    Procedure: ESOPHAGOGASTRODUODENOSCOPY;  Surgeon: Brunner, Mark I, MD;  Location:  [facility];  Service: Gastroenterology;  Laterality: N/A;   • HYSTERECTOMY     • HYSTERECTOMY     • OTHER SURGICAL HISTORY      LEFT ARM PLATE INSTALLED.     General Information     Row Name 11/04/20 0905          OT Time and Intention    Document Type  evaluation  -     Mode of Treatment  occupational therapy  -     Row Name 11/04/20 0905          General Information    Patient Profile Reviewed  yes  -AC     Prior Level of Function  independent:;ADL's;all household mobility  -     Existing Precautions/Restrictions  fall;oxygen therapy device and L/min  -     Barriers to Rehab  cognitive status;uncooperative;hearing deficit  -AC     Row Name 11/04/20 0905          Living Environment    Lives With  child(ezekiel), adult  -     Row Name 11/04/20 0905          Home Main Entrance    Number of Stairs, Main Entrance  none ramp  -AC     Stair Railings, Main Entrance  none  -AC     Row Name 11/04/20 0905          Cognition    Orientation Status (Cognition)  oriented x 3  -     Row Name 11/04/20 0905          Safety Issues, Functional Mobility    Safety Issues Affecting Function (Mobility)  insight into deficits/self-awareness;judgment;problem-solving;safety precaution awareness;safety precautions follow-through/compliance  -     Impairments Affecting Function (Mobility)  balance;cognition;endurance/activity tolerance;strength  -       User Key  (r) = Recorded By, (t) = Taken By, (c) = Cosigned By    Initials Name Provider Type    AC Vidya Nunez OT Occupational Therapist        Mobility/ADL's     Row Name 11/04/20 0905          Bed Mobility    Bed Mobility  supine-sit;sit-supine  -     Supine-Sit [place] (Bed Mobility)  minimum assist (75% patient effort)  -     Sit-Supine  [place] (Bed Mobility)  minimum assist (75% patient effort)  -     Bed Mobility, Safety Issues  cognitive deficits limit understanding;decreased use of arms for pushing/pulling;decreased use of legs for bridging/pushing  -     Assistive Device (Bed Mobility)  bed rails;head of bed elevated  -     Row Name 11/04/20 0905          Transfers    Transfers  bed-chair transfer;sit-stand transfer  -     Bed-Chair [place] (Transfers)  minimum assist (75% patient effort) bed to chair to bed  -     Assistive Device (Bed-Chair Transfers)  -- HHA on L  -     Sit-Stand [place] (Transfers)  contact guard  -     Row Name 11/04/20 0905          Sit-Stand Transfer    Assistive Device (Sit-Stand Transfers)  -- pt declined walker  -     Row Name 11/04/20 0905          Functional Mobility    Functional Mobility- Comment  pt declined to ambulate  -[facility] Name 11/04/20 0905          Activities of Daily Living    BADL Assessment/Intervention  lower body dressing  -[facility] Name 11/04/20 0905          Lower Body Dressing Assessment/Training    [place] Level (Lower Body Dressing)  don;socks;dependent (less than 25% patient effort)  -     Position (Lower Body Dressing)  supine  -     Comment (Lower Body Dressing)  pt declined to attempt; pt reporting SOA  -       User Key  (r) = Recorded By, (t) = Taken By, (c) = Cosigned By    Initials Name Provider Type     Vidya Nunez, OT Occupational Therapist        Obj/Interventions     Row Name 11/04/20 0905          Sensory Assessment (Somatosensory)    Sensory Assessment (Somatosensory)  unable/difficult to assess  -[facility] Name 11/04/20 0905          Vision Assessment/Intervention    Visual Impairment/Limitations  unable/difficult to assess  -     Row Name 11/04/20 0905          Range of Motion Comprehensive    General Range of Motion  bilateral upper extremity ROM WNL  -     Comment, General Range of Motion  arthritic changes in hands  -      Row Name 11/04/20 0905          Strength Comprehensive (MMT)    Comment, General Manual Muscle Testing (MMT) Assessment  Pt would not allow therapist to assess  -AC       User Key  (r) = Recorded By, (t) = Taken By, (c) = Cosigned By    Initials Name Provider Type    Vidya Wu, OT Occupational Therapist        Goals/Plan     Row Name 11/04/20 0905          Bed Mobility Goal 1 (OT)    Activity/Assistive Device (Bed Mobility Goal 1, OT)  sit to supine;supine to sit  -AC     [place] Level/Cues Needed (Bed Mobility Goal 1, OT)  contact guard assist  -AC     Time Frame (Bed Mobility Goal 1, OT)  by discharge  -AC     Progress/Outcomes (Bed Mobility Goal 1, OT)  goal ongoing  -     Row Name 11/04/20 0905          Transfer Goal 1 (OT)    Activity/Assistive Device (Transfer Goal 1, OT)  bed-to-chair/chair-to-bed;toilet;commode, bedside with drop arms  -AC     [place] Level/Cues Needed (Transfer Goal 1, OT)  contact guard assist  -AC     Time Frame (Transfer Goal 1, OT)  by discharge  -AC     Progress/Outcome (Transfer Goal 1, OT)  goal ongoing  -     Row Name 11/04/20 0905          Toileting Goal 1 (OT)    Activity/Device (Toileting Goal 1, OT)  adjust/manage clothing;perform perineal hygiene  -AC     [place] Level/Cues Needed (Toileting Goal 1, OT)  minimum assist (75% or more patient effort)  -AC     Time Frame (Toileting Goal 1, OT)  by discharge  -AC     Progress/Outcome (Toileting Goal 1, OT)  goal ongoing  -     Row Name 11/04/20 0905          Grooming Goal 1 (OT)    Activity/Device (Grooming Goal 1, OT)  hair care;oral care;wash face, hands  -AC     [place] (Grooming Goal 1, OT)  set-up required;supervision required  -AC     Time Frame (Grooming Goal 1, OT)  by discharge  -AC     Progress/Outcome (Grooming Goal 1, OT)  goal ongoing  -AC       User Key  (r) = Recorded By, (t) = Taken By, (c) = Cosigned By    Initials Name Provider Type    Vidya Wu, OT Occupational  Therapist        Clinical Impression     Row Name 11/04/20 0905          Pain Assessment    Additional Documentation  Pain Scale: Numbers Pre/Post-Treatment (Group)  -     Row Name 11/04/20 0905          Pain Scale: Numbers Pre/Post-Treatment    Pretreatment Pain Rating  5/10  -AC     Posttreatment Pain Rating  5/10  -AC     Pain Location - Side  Bilateral  -AC     Pain Location - Orientation  upper  -AC     Pain Location  extremity  -AC     Pain Intervention(s)  Repositioned  -AC     Row Name 11/04/20 0905          Plan of Care Review    Plan of Care Reviewed With  patient  -AC     Outcome Summary  OT eval complete.  Pt requiring max encouragement to participate.   Pt presents with confusion, weakness and decreased activity tolerance limiting independence with ADLs.   Pt required min A supine to sit,  CGA STS Min A with HHA on L to transfer bed to chair and chair to bed.  Pt's expected UB self care/ self feeding min A,  LB self care max A/total A. OT will attempt to follow to advance pt toward PLOF with ADLs.  Recommend home with 24/7 supervision and HHOT.  -     Row Name 11/04/20 0905          Therapy Assessment/Plan (OT)    Rehab Potential (OT)  fair, will monitor progress closely  -     Criteria for Skilled Therapeutic Interventions Met (OT)  yes  -AC     Therapy Frequency (OT)  daily  -     Row Name 11/04/20 0905          Therapy Plan Review/Discharge Plan (OT)    Anticipated Discharge Disposition (OT)  home with 24/7 care;home with home health  -     Row Name 11/04/20 0905          Vital Signs    Pre Systolic BP Rehab  151  -AC     Pre Treatment Diastolic BP  85  -AC     Pretreatment Heart Rate (beats/min)  75  -AC     Posttreatment Heart Rate (beats/min)  95  -AC     Pre SpO2 (%)  99  -AC     O2 Delivery Pre Treatment  supplemental O2  -AC     O2 Delivery Intra Treatment  supplemental O2  -AC     Post SpO2 (%)  92  -AC     O2 Delivery Post Treatment  supplemental O2  -AC     Pre Patient Position   Supine  -AC     Intra Patient Position  Standing  -AC     Post Patient Position  Supine  -AC     Row Name 11/04/20 0905          Positioning and Restraints    Pre-Treatment Position  in bed  -AC     Post Treatment Position  bed  -AC     In Bed  fowlers;call light within reach;encouraged to call for assist;with family/caregiver  -       User Key  (r) = Recorded By, (t) = Taken By, (c) = Cosigned By    Initials Name Provider Type    Vidya Wu, OT Occupational Therapist        Outcome Measures     Row Name 11/04/20 0905          How much help from another is currently needed...    Putting on and taking off regular lower body clothing?  2  -AC     Bathing (including washing, rinsing, and drying)  2  -AC     Toileting (which includes using toilet bed pan or urinal)  1  -AC     Putting on and taking off regular upper body clothing  2  -AC     Taking care of personal grooming (such as brushing teeth)  2  -AC     Eating meals  3  -AC     AM-PAC 6 Clicks Score (OT)  12  -AC     Row Name 11/04/20 0905          Functional Assessment    Outcome Measure Options  AM-PAC 6 Clicks Daily Activity (OT)  -       User Key  (r) = Recorded By, (t) = Taken By, (c) = Cosigned By    Initials Name Provider Type    Vidya Wu, OT Occupational Therapist        Occupational Therapy Education                 Title: PT OT SLP Therapies (In Progress)     Topic: Occupational Therapy (In Progress)     Point: ADL training (In Progress)     Description:   Instruct learner(s) on proper safety adaptation and remediation techniques during self care or transfers.   Instruct in proper use of assistive devices.              Learning Progress Summary           Patient Acceptance, E, NL by  at 11/4/2020 0905    Comment: benefits of activity, role of OT                   Point: Home exercise program (Not Started)     Description:   Instruct learner(s) on appropriate technique for monitoring, assisting and/or progressing therapeutic  exercises/activities.              Learner Progress:  Not documented in this visit.          Point: Precautions (Not Started)     Description:   Instruct learner(s) on prescribed precautions during self-care and functional transfers.              Learner Progress:  Not documented in this visit.          Point: Body mechanics (Not Started)     Description:   Instruct learner(s) on proper positioning and spine alignment during self-care, functional mobility activities and/or exercises.              Learner Progress:  Not documented in this visit.                      User Key     Initials Effective Dates Name Provider Type [facility] 06/23/15 -  Vidya Nunez OT Occupational Therapist OT              OT Recommendation and Plan  Therapy Frequency (OT): daily  Plan of Care Review  Plan of Care Reviewed With: patient  Outcome Summary: OT eval complete.  Pt requiring max encouragement to participate.   Pt presents with confusion, weakness and decreased activity tolerance limiting independence with ADLs.   Pt required min A supine to sit,  CGA STS Min A with HHA on L to transfer bed to chair and chair to bed.  Pt's expected UB self care/ self feeding min A,  LB self care max A/total A. OT will attempt to follow to advance pt toward PLOF with ADLs.  Recommend home with 24/7 supervision and HHOT.     Time Calculation:   Time Calculation- OT     Row Name 11/04/20 0905             Time Calculation- OT    OT Start Time  0905  -      OT Received On  11/04/20  [facility]      OT Goal Re-Cert Due Date  11/14/20  -        User Key  (r) = Recorded By, (t) = Taken By, (c) = Cosigned By    Initials Name Provider Type     Vidya Nunez OT Occupational Therapist        Therapy Charges for Today     Code Description Service Date Service Provider Modifiers Qty    [id number]  OT EVAL MOD COMPLEXITY 4 11/4/2020 Vidya Nunez OT GO 1               Vidya Nunez OT  11/4/2020

## 2020-11-04 NOTE — DISCHARGE SUMMARY
[facility] Medicine Services  DISCHARGE SUMMARY    Patient Name: Kandace Zhou  : 1933  MRN: [id number]    Date of Admission: 10/30/2020  8:02 PM  Date of Discharge: 2020  Primary Care Physician: Vanessa Trujillo MD    Consults     Date and Time Order Name Status Description    10/30/2020 2317 Inpatient Gastroenterology Consult Completed           Hospital Course     Presenting Problem:   Upper GI bleed [K92.2]  Upper GI bleed [K92.2]  Upper GI bleed [K92.2]  Upper GI bleed [K92.2]    Active Hospital Problems    Diagnosis  POA   • **Upper GI bleed [K92.2]  Yes   • Symptomatic anemia [D64.9]  Yes   • Acute diastolic heart failure (CMS/HCC) [I50.31]  Yes   • Chronic blood loss anemia [D50.0]  Yes   • DM (diabetes mellitus), type 2 (CMS/HCC) [E11.9]  Yes   • Essential hypertension [I10]  Yes   • GERD (gastroesophageal reflux disease) [K21.9]  Yes   • Hyperlipidemia LDL goal <100 [E78.5]  Yes   • Permanent atrial fibrillation (CMS/HCC) [I48.21]  Yes   • History of CVA in adulthood [Z86.73]  Not Applicable      Resolved Hospital Problems   No resolved problems to display.        Kandace Zhou is an 87 y.o. female with a past medical history significant for chronic respiratory failure on 4L supplemental oxygen, DM2, hypertension, HLD, permanent atrial fibrillation anticoagulated on Xarelto, CHF, tachy/louann syndrome s/p pacemaker, GERD, and prior CVA with residual right sided weakness who presents with a functional decline and blood in stools.    Vital signs were within normal limits on presentation other than elevated blood pressure.  Labs on presentation showed an elevated proBNP of 5000.  CMP was within normal limits other than a mildly elevated ALT and AST.  CBC showed a mild leukocytosis of 11.  Lactate was within normal limits.  Iron studies showed iron deficiency anemia.  Hemoglobin was 8.2 which is much lower than her baseline.  UA was negative.  X-ray obtained showed  per mild pulmonary vascular congestion.  She was given increased dose of PO lasix with improvement in her respiratory status.     GI was consulted and EGD was performed on 11/1/2020.  EGD was normal.  Patient was prepped for a colonoscopy which showed stool in the rectum which was manually disimpacted.  She had sigmoid diverticulosis.  She was also noted to have a few Stercoral ulcers the rectum.  Recommended bowel regimen.  She is appropriate for discharge.  Her Xarelto was held on discharge.  This can be resumed for PCP in 1 to 2 weeks pending table hemoglobin and no additional bleeding.    She is currently on room air.  Tolerating p.o.  We will have her follow-up with her PCP in 1 week.      Discharge Follow Up Recommendations for outpatient labs/diagnostics:  Follow-up with Dr. Trujillo in 1 week    Day of Discharge     HPI:   Patient is doing well this morning.  Discussed with daughter.  She states that she has something stuck in her throat but EGD was normal.  Daughter thinks she will be better at home.    Review of Systems  General: denies fevers or chills  CV: denies chest pain  Resp: denies shortness of breath  Abd: denies abd pain, nausea      Vital Signs:   Temp:  [97.5 °F (36.4 °C)-99 °F (37.2 °C)] 97.5 °F (36.4 °C)  Heart Rate:  [] 86  Resp:  [18-22] 18  BP: ()/(63-88) 151/85     Physical Exam:  Constitutional: No acute distress, awake, alert, frail elderly older female  Respiratory: Clear to auscultation bilaterally, respiratory effort normal   Cardiovascular: RRR, no murmurs, rubs, or gallops, palpable pedal pulses bilaterally  Gastrointestinal: Positive bowel sounds, soft, nontender, nondistended  Musculoskeletal: No bilateral ankle edema  Psychiatric: Appropriate affect, cooperative  Neurologic: Oriented x 3, no focal neurological deficits      Pertinent  and/or Most Recent Results     Results from last 7 days   Lab Units 11/04/20  0539 11/02/20  1201 11/01/20 2044 11/01/20  1254  10/31/20  1920 10/31/20  0842 10/31/20  0538  10/30/20  1717   WBC 10*3/mm3 12.90* 13.48*  --  9.78  --   --  8.91  --  11.04*   HEMOGLOBIN g/dL 8.7* 9.9* 9.5* 9.7* 8.3* 8.3* 7.9*  7.9*   < > 8.2*   HEMATOCRIT % 32.1* 35.2 37.7 35.7 29.5* 29.3* 27.6*  27.6*   < > 28.9*   PLATELETS 10*3/mm3 139* 202  --  242  --   --  230  --  292   SODIUM mmol/L 132* 138  --  137  --   --  140  --  142   POTASSIUM mmol/L 3.4* 4.1  --  4.2  --   --  4.1  --  5.1   CHLORIDE mmol/L 97* 100  --  96*  --   --  99  --  105   CO2 mmol/L 25.0 22.0  --  31.0*  --   --  32.0*  --  26.0   BUN mg/dL 13 22  --  31*  --   --  42*  --  41*   CREATININE mg/dL 0.78 0.93  --  1.03*  --   --  1.09*  --  1.04*   GLUCOSE mg/dL 106* 137*  --  143*  --   --  137*  --  193*   CALCIUM mg/dL 8.4* 8.7  --  8.6  --   --  8.8  --  9.2    < > = values in this interval not displayed.     Results from last 7 days   Lab Units 10/30/20  1717   BILIRUBIN mg/dL 0.8   ALK PHOS U/L 71   ALT (SGPT) U/L 71*   AST (SGOT) U/L 82*           Invalid input(s): TG, LDLCALC, LDLREALC  Results from last 7 days   Lab Units 10/31/20  0044 10/30/20  1717   TSH uIU/mL 0.304  --    PROBNP pg/mL  --  5,538.0*   TROPONIN T ng/mL  --  <0.010   LACTATE mmol/L  --  1.9       Brief Urine Lab Results  (Last result in the past 365 days)      Color   Clarity   Blood   Leuk Est   Nitrite   Protein   CREAT   Urine HCG        10/31/20 0203 Yellow Clear Negative Negative Negative Negative               Microbiology Results Abnormal     Procedure Component Value - Date/Time    COVID PRE-OP / PRE-PROCEDURE SCREENING ORDER (NO ISOLATION) - Swab, Nasal Cavity [[id number]]  (Normal) Collected: 10/31/20 0114    Lab Status: Final result Specimen: Swab from Nasal Cavity Updated: 10/31/20 0203    Narrative:      The following orders were created for panel order COVID PRE-OP / PRE-PROCEDURE SCREENING ORDER (NO ISOLATION) - Swab, Nasal Cavity.  Procedure                               Abnormality          Status                     ---------                               -----------         ------                     COVID-19, ABBOTT IN-HOUS...[[id number]]  Normal              Final result                 Please view results for these tests on the individual orders.    COVID-19, ABBOTT IN-HOUSE,NP Swab (NO TRANSPORT MEDIA) 2 HR TAT - Swab, Nasal Cavity [[id number]]  (Normal) Collected: 10/31/20 0114    Lab Status: Final result Specimen: Swab from Nasal Cavity Updated: 10/31/20 0203     COVID19 Not Detected    Narrative:      Fact sheet for providers: https://www.fda.gov/media/245095/download     Fact sheet for patients: https://www.fda.gov/media/519904/download          Imaging Results (All)     Procedure Component Value Units Date/Time    XR Chest 1 View [[id number]] Collected: 10/30/20 1827     Updated: 10/30/20 2156    Narrative:      EXAMINATION: XR CHEST, SINGLE VIEW - 10/30/2020     INDICATION: Weakness, dizziness, altered mental status.     COMPARISON: 06/09/2019     FINDINGS: Left-sided single-lead pacemaker is noted. The heart is mildly  enlarged. Vasculature appears cephalized. There is mild new elevation of  the right hemidiaphragm. There is probably trace right pleural effusion  which was present previously.       Impression:      1. Cardiomegaly and mild pulmonary vascular congestion.  2. Mildly elevated right hemidiaphragm and mild right pleural effusion.     DICTATED:   10/30/2020  EDITED/ls :   10/30/2020          This report was finalized on 10/30/2020 9:53 PM by Dr. Sanjeev Johnston MD.                       Results for orders placed during the hospital encounter of 10/30/20   Transthoracic Echo Complete With Contrast if Necessary Per Protocol    Narrative · Left ventricular ejection fraction appears to be 51 - 55%.  · The right atrial cavity is severely dilated.  · There is mainly affecting the non-coronary cusp(s).  · Mild to moderate mitral valve regurgitation is present.  · Estimated right ventricular  systolic pressure from tricuspid   regurgitation is moderately elevated (45-55 mmHg).  · Moderate tricuspid valve regurgitation is present.  · The left atrial cavity is moderately dilated.          Plan for Follow-up of Pending Labs/Results:     Discharge Details        Discharge Medications      Changes to Medications      Instructions Start Date   furosemide 20 MG tablet  Commonly known as: LASIX  What changed:   · how much to take  · when to take this   40 mg, Oral, Daily, May take additional tablet as needed for increased shortness of breath, 3lb wt gain in 24 hours         Continue These Medications      Instructions Start Date   alendronate 70 MG tablet  Commonly known as: FOSAMAX   70 mg, Oral, Weekly      benzonatate 100 MG capsule  Commonly known as: TESSALON   100 mg, Oral, 3 Times Daily PRN      carvedilol 12.5 MG tablet  Commonly known as: COREG   12.5 mg, Oral, 2 Times Daily      Claritin 10 MG tablet  Generic drug: loratadine   10 mg, Oral, Daily      guaiFENesin 600 MG 12 hr tablet  Commonly known as: MUCINEX   1,200 mg, Oral, 2 Times Daily      Jardiance 25 MG tablet  Generic drug: Empagliflozin   Oral, Daily      lisinopril 20 MG tablet  Commonly known as: PRINIVIL,ZESTRIL   20 mg, Oral, Daily      metFORMIN 1000 MG tablet  Commonly known as: GLUCOPHAGE   1,000 mg, Oral, 2 Times Daily With Meals      methylPREDNISolone 4 MG dose pack  Commonly known as: MEDROL   Oral, 2 Times Daily, Take as directed on package instructions.       mirtazapine 15 MG tablet  Commonly known as: REMERON   15 mg, Oral, Nightly      nitroglycerin 0.4 MG SL tablet  Commonly known as: NITROSTAT   0.4 mg, Sublingual, Every 5 Minutes PRN, Take no more than 3 doses in 15 minutes.      O2  Commonly known as: OXYGEN   4 L/min, Inhalation, Once      omeprazole 20 MG capsule  Commonly known as: priLOSEC   20 mg, Oral, 2 Times Daily      oxyCODONE-acetaminophen 5-325 MG per tablet  Commonly known as: PERCOCET   1 tablet, Oral, 4  Times Daily      potassium chloride 20 MEQ CR tablet  Commonly known as: K-DUR,KLOR-CON   20 mEq, Oral, Daily      pravastatin 40 MG tablet  Commonly known as: PRAVACHOL   40 mg, Oral, Daily      sertraline 50 MG tablet  Commonly known as: ZOLOFT   50 mg, Oral, Daily      Vitamin D3 25 MCG (1000 UT) capsule   1,000 Units, Oral, Daily         Stop These Medications    amoxicillin-clavulanate 875-125 MG per tablet  Commonly known as: AUGMENTIN     Xarelto 20 MG tablet  Generic drug: rivaroxaban            Allergies   Allergen Reactions   • Sulfa Antibiotics Other (See Comments)     CANNOT REMEMBER   • Tetanus Toxoids Other (See Comments)     PT. CANNOT REMEMBER   • Tuberculin Tests Other (See Comments)     PT. CANNOT REMEMBER   • Lortab [Hydrocodone-Acetaminophen] Rash         Discharge Disposition:  Home or Self Care    Diet:  Hospital:  Diet Order   Procedures   • Diet Regular; Consistent Carbohydrate       Activity:  Activity Instructions     Activity as Tolerated            Restrictions or Other Recommendations:         CODE STATUS:    Code Status and Medical Interventions:   Ordered at: 10/30/20 6910     Limited Support to NOT Include:    Intubation     Level Of Support Discussed With:    Patient     Code Status:    No CPR     Medical Interventions (Level of Support Prior to Arrest):    Limited     Comments:    patient and living will       Future Appointments   Date Time Provider Department Center   1/5/2021 11:45 AM Connor Brand MD [facility] None       Additional Instructions for the Follow-ups that You Need to Schedule     Discharge Follow-up with PCP   As directed       Currently Documented PCP:    Vansesa Trujillo MD    PCP Phone Number:    [phone]     Follow Up Details: 1 week                     Cori Sarah MD  11/04/20      Time Spent on Discharge:  I spent  45  minutes on this discharge activity which included: face-to-face encounter with the patient, reviewing the data in the system,  coordination of the care with the nursing staff as well as consultants, documentation, and entering orders.

## 2020-11-05 NOTE — NURSING NOTE
Patient's discharge home delayed secondary to daughter forgetting to have a personal oxygen tank available for pt. Daughter had sent family friends to collect patient and it was found before patient got in the d/c wheelchair that there was no oxygen for her to transport home. After investigation of possibilities it was decided that daughter would collect patient's tank and bring to the hospital and then provide patient with a ride home. Patient and daughter both verbalize understanding of POC--spoke with daughter several times on phone including ED CM to explore options for obtaining a tank. As of 8:24pm daughter had not arrived yet but was en route.

## 2020-11-05 NOTE — OUTREACH NOTE
Prep Survey      Responses   [facility]  [place]   Is LACE score < 7 ?  No   Eligibility  Readm Mgmt   Discharge diagnosis  **Upper GI bleed, Acute diastolic heart failure    Does the patient have one of the following disease processes/diagnoses(primary or secondary)?  CHF   Does the patient have Home health ordered?  Yes   What is the Home health agency?   [facility]    Is there a DME ordered?  No   Prep survey completed?  Yes          Marilee Grimes RN

## 2020-11-05 NOTE — PROGRESS NOTES
Discharge Planning Assessment  [facility]     Patient Name: Kandace Zhou  MRN: [id number]  Today's Date: 11/4/2020    Admit Date: 10/30/2020    Discharge Needs Assessment    No documentation.       Discharge Plan     Row Name 11/04/20 1931       Plan    Plan  update    Plan Comments  PC from [facility] on [facility] explaining pt is discharged but needs O2 tank to travel- she is on 4L 24/7; receives oxygen from [employer] in [place]. CM spoke with person on call with resp company. He was given RN number to contact re: [facility] [address] to deliver O2. He did not call and RN called author and spoke with her again. CM suggested calling pt daughter as she did offer to come with O2 from [place]        Continued Care and Services - Admitted Since 10/30/2020     Home Medical Care     Service Provider Request Status Selected Services Address Phone Fax    [facility]  Pending - No Request Sent N/A [address] [address] [phone]              Expected Discharge Date and Time     Expected Discharge Date Expected Discharge Time    Nov 4, 2020         Demographic Summary    No documentation.       Functional Status    No documentation.       Psychosocial    No documentation.       Abuse/Neglect    No documentation.       Legal    No documentation.       Substance Abuse    No documentation.       Patient Forms    No documentation.           Anayeli Frazier, RN

## 2020-11-06 NOTE — OUTREACH NOTE
CHF Week 1 Survey      Responses   [facility] patient discharged from?  [place]   Does the patient have one of the following disease processes/diagnoses(primary or secondary)?  CHF   CHF Week 1 attempt successful?  Yes   Call start time  1544   Revoke  Change in health status-moved to LTC/SNF/Hospice   Call end time  1545   Discharge diagnosis  **Upper GI bleed, Acute diastolic heart failure    Is patient permission given to speak with other caregiver?  Yes   List who call center can speak with  POA- dtr   Person spoke with today (if not patient) and relationship  Marleny-dtsally   What is the Home health agency?   [facility]   -           Kellee Scales RN

## 2024-11-13 NOTE — PLAN OF CARE
Goal Outcome Evaluation:  Plan of Care Reviewed With: patient  Progress: no change   VSS, 3L NC, paced rhythm on tele, pt c/o pain 1x, PRN percocet administered, effective. NPO after midnight. Pt is confused, uncooperative, and anxious this morning. Pt c/o feeling short of air, O2 sats remained > 93 on 3L NC. Pt refused vitals, refused to let the nurse listen to lung sounds, Pt refused to increase HOB. Pt refused labs this AM and refused to sign consent for EGD, provider notified, family notified. PEPE hammond, pt rested well, will continue to monitor.   Attending to bill Attending to bill Attending to bill Attending to bill Attending to bill Attending to bill Attending to bill

## (undated) DEVICE — IRRIGATOR BULB ASEPTO 60CC STRL

## (undated) DEVICE — LUBE GEL ENDOGLIDE 1.1OZ

## (undated) DEVICE — SOL IRR H2O BTL 1000ML STRL

## (undated) DEVICE — CAUTERY TIP POLISHER: Brand: DEVON

## (undated) DEVICE — THE BITE BLOCK MAXI, LATEX FREE STRAP IS USED TO PROTECT THE ENDOSCOPE INSERTION TUBE FROM BEING BITTEN BY THE PATIENT.

## (undated) DEVICE — TUBING, SUCTION, 1/4" X 10', STRAIGHT: Brand: MEDLINE

## (undated) DEVICE — ST EXT IV SMARTSITE 2VLV SP M LL 5ML IV1

## (undated) DEVICE — ADULT, W/LG. BACK PAD, RADIOTRANSPARENT ELEMENT AND LEAD WIRE: Brand: DEFIBRILLATION ELECTRODES

## (undated) DEVICE — SOL NS 500ML

## (undated) DEVICE — KT ORCA ORCAPOD DISP STRL

## (undated) DEVICE — PK CATH CARD 10

## (undated) DEVICE — CONTN GRAD MEAS TRIANG 32OZ BLK

## (undated) DEVICE — INTRO TEAR AWAY/LVD W/SD PRT 6F 13CM

## (undated) DEVICE — DECANTER: Brand: UNBRANDED

## (undated) DEVICE — PENCL E/S HNDSWCH ROCKRBTN HOLSTR 10FT

## (undated) DEVICE — LEX ELECTRO PHYSIOLOGY: Brand: MEDLINE INDUSTRIES, INC.

## (undated) DEVICE — SET PRIMARY GRVTY 10DP MALE LL 104IN

## (undated) DEVICE — HYBRID CO2 TUBING/CAP SET FOR OLYMPUS® SCOPES & CO2 SOURCE: Brand: ERBE

## (undated) DEVICE — DRSNG SURG AQUACEL AG 9X15CM

## (undated) DEVICE — INTRO SHEATH ENGAGE W/50 GW .038 8F12

## (undated) DEVICE — MEDI-VAC YANKAUER SUCTION HANDLE W/BULBOUS TIP: Brand: CARDINAL HEALTH

## (undated) DEVICE — CANN NASL CO2 DIVIDED A/

## (undated) DEVICE — INTRO ACCSR BLNT TP

## (undated) DEVICE — GRADUATE CONTN 1000ML

## (undated) DEVICE — LIMB HOLDER, WRIST/ANKLE: Brand: DEROYAL

## (undated) DEVICE — SYR LUERLOK 50ML

## (undated) DEVICE — SPNG ENDO BEDSIDE TUB ENZYM

## (undated) DEVICE — TEMPERATURE ABLATION CATHETER: Brand: BLAZER® II XP

## (undated) DEVICE — MAGNETIC DRAPE: Brand: DEVON

## (undated) DEVICE — ST INF PRI SMRTSTE 20DRP 2VLV 24ML 117

## (undated) DEVICE — SOL NACL 0.9PCT 1000ML